# Patient Record
Sex: MALE | Race: WHITE | NOT HISPANIC OR LATINO | Employment: FULL TIME | URBAN - METROPOLITAN AREA
[De-identification: names, ages, dates, MRNs, and addresses within clinical notes are randomized per-mention and may not be internally consistent; named-entity substitution may affect disease eponyms.]

---

## 2017-01-10 DIAGNOSIS — F41.1 GENERALIZED ANXIETY DISORDER: Primary | ICD-10-CM

## 2017-01-10 DIAGNOSIS — G47.00 INSOMNIA, UNSPECIFIED TYPE: ICD-10-CM

## 2017-01-10 RX ORDER — TRAZODONE HYDROCHLORIDE 50 MG/1
50 TABLET, FILM COATED ORAL
Qty: 90 TABLET | Refills: 0 | Status: SHIPPED | OUTPATIENT
Start: 2017-01-10 | End: 2017-02-17

## 2017-01-10 RX ORDER — SERTRALINE HYDROCHLORIDE 25 MG/1
25 TABLET, FILM COATED ORAL DAILY
Qty: 90 TABLET | Refills: 0 | Status: SHIPPED | OUTPATIENT
Start: 2017-01-10 | End: 2017-02-17

## 2017-01-10 NOTE — TELEPHONE ENCOUNTER
Medication is being filled for 1 time refill only due to:  Patient needs to be seen because it has been more than one year since last visit. OK for 90 day refill per Dr Carter to get through until scheduled appt.

## 2017-02-17 ENCOUNTER — OFFICE VISIT (OUTPATIENT)
Dept: FAMILY MEDICINE | Facility: CLINIC | Age: 47
End: 2017-02-17

## 2017-02-17 VITALS
DIASTOLIC BLOOD PRESSURE: 77 MMHG | SYSTOLIC BLOOD PRESSURE: 113 MMHG | HEIGHT: 73 IN | HEART RATE: 54 BPM | OXYGEN SATURATION: 100 % | WEIGHT: 157.08 LBS | BODY MASS INDEX: 20.82 KG/M2

## 2017-02-17 DIAGNOSIS — Z13.1 SCREENING FOR DIABETES MELLITUS: ICD-10-CM

## 2017-02-17 DIAGNOSIS — Z23 NEEDS FLU SHOT: ICD-10-CM

## 2017-02-17 DIAGNOSIS — Z00.00 PREVENTATIVE HEALTH CARE: Primary | ICD-10-CM

## 2017-02-17 DIAGNOSIS — G47.00 INSOMNIA, UNSPECIFIED TYPE: ICD-10-CM

## 2017-02-17 DIAGNOSIS — F41.1 GENERALIZED ANXIETY DISORDER: ICD-10-CM

## 2017-02-17 DIAGNOSIS — Z13.220 SCREENING CHOLESTEROL LEVEL: ICD-10-CM

## 2017-02-17 ASSESSMENT — PAIN SCALES - GENERAL: PAINLEVEL: NO PAIN (0)

## 2017-02-17 NOTE — PROGRESS NOTES
CHIEF COMPLAINT:  Physical exam.      HISTORY OF PRESENT ILLNESS:  Alessio is a 46-year-old gentleman with a history of generalized anxiety disorder.  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  He really enjoys his work.  He is .  His wife is currently dealing with what sounds like possible influenza.  He did not get a shot this year and is willing to do so today.      He is on a combination of paleo and keto (high fat) diet and wanted my opinion about that.  He already went ahead and had extensive laboratory work done (he had this done and the results are put into an heike on his phone).  He reviewed all the results with me.  They look great.  His cholesterol at one point had been 280 (total) and it dropped all the way down to 201.  HDLs are perfect and his LDL is now 131.  Triglycerides are in the 50s.  Glucose has been normal.  A1c was 5.2%.  Liver function tests, electrolytes and complete blood count all normal.  We reviewed all the results in detail.      From a generalized anxiety disorder standpoint, his CLAU-7 score is just 3.  Insomnia responds well to trazodone 50 mg at night.  Anxiety is doing well with sertraline 25 mg daily.  I will go ahead and take care of refills on both of those.      SOCIAL, FAMILY AND PAST MEDICAL HISTORY:  Reviewed and unhanged.        HEALTHCARE MAINTENANCE:  He wears glasses and his eyes are checked every 1-2 years.  He recently had them checked and there has been no change in the prescription.  His hearing is fine.  Dental care is up to date and occurs every 6 months.  /77.  BMI down to 20.9.  He is happy with the weight loss.  When I last saw him 10/2015, it was 173 and now he is at 157.  He is working out at the gym and feels better and stronger than he has in a long time.  Immunizations up to date with the exception of seasonal influenza.  We will give that today.  Labs all done.  Meds will be refilled.  No need for prostate or colon  cancer screening given his young age and lack of family history.      REVIEW OF SYSTEMS:  He would like me to do a skin survey on his back.  I will certainly do that.  Other than that, no concerns.      OBJECTIVE:  Alessio is in absolutely no distress.  Affect upbeat.  Good eye contact.  Excellent insight and judgment.  /77 with a pulse of 54.  He is approximately 6 feet 1 inch and weighs 157 with a BMI of 20.9.  O2 sats are 100% on room air.   HEENT:  Head is normocephalic, atraumatic.  Ears are essentially free of any cerumen.  The TMs look fine.  There is no pain with palpation of the frontal or maxillary sinuses.  Eyes are grossly normal.  Nose is free congestion or discharge.  Teeth in good repair.  Mucous membranes are moist.  Throat looks benign.   NECK:  Supple without adenopathy or thyromegaly.  No carotid bruits are heard, no JVD.   BACK:  Smooth and straight.  No pain to percussion, no CVA tenderness.   LUNGS:  Clear to auscultation bilaterally.   HEART:  Reveals a regular rate and rhythm without murmur.   ABDOMEN:  Benign.   EXTREMITIES:  Ankles are free of any edema.  Good peripheral pulses.     SKIN:  Careful examination of the skin reveals a number of various skin changes on his back.  Freckles on the top of the shoulders.  He has some compound nevi, cherry angiomas, flat nevi but no precancerous or suspicious lesions.  I surveyed his face, chest and arms, as well as his back.  Nothing of any concern noted.        Labs have already been done.      ASSESSMENT AND PLAN:   1.  Adult physical exam, up to date with healthcare maintenance strategies.   2.  Seasonal influenza immunization given today.   3.  Extensive laboratory work done elsewhere.  All results were reviewed and all normal.   4.  History of generalized anxiety disorder, on sertraline 25 mg daily.  Continue with that.  Quantity 90 with 4 refills sent in.   5.  Chronic insomnia, responding well to trazodone 50 mg at night.  Continue with  that.  Quantity 90 with 4 refills sent in as well.   6.  I look forward to seeing him back in approximately 15 months.  Call with any problems or questions in the meantime.

## 2017-02-17 NOTE — MR AVS SNAPSHOT
After Visit Summary   2/17/2017    Alessio Dallas    MRN: [id number]           Patient Information     Date Of Birth          1970        Visit Information        Provider Department      2/17/2017 8:00 AM Papo Carter MD [facility]        Today's Diagnoses     Preventative health care    -  1    Screening for diabetes mellitus        Screening cholesterol level        Generalized anxiety disorder        Insomnia, unspecified type        Needs flu shot          Care Instructions      Preventive Health Recommendations  Male Ages 40 to 49    Yearly exam:             See your health care provider every year in order to  o   Review health changes.   o   Discuss preventive care.    o   Review your medicines if your doctor has prescribed any.    You should be tested each year for STDs (sexually transmitted diseases) if you re at risk.     Have a cholesterol test every 5 years.     Have a colonoscopy (test for colon cancer) if someone in your family has had colon cancer or polyps before age 50.     After age 45, have a diabetes test (fasting glucose). If you are at risk for diabetes, you should have this test every 3 years.      Talk with your health care provider about whether or not a prostate cancer screening test (PSA) is right for you.    Shots: Get a flu shot each year. Get a tetanus shot every 10 years.     Nutrition:    Eat at least 5 servings of fruits and vegetables daily.     Eat whole-grain bread, whole-wheat pasta and brown rice instead of white grains and rice.     Talk to your provider about Calcium and Vitamin D.     Lifestyle    Exercise for at least 150 minutes a week (30 minutes a day, 5 days a week). This will help you control your weight and prevent disease.     Limit alcohol to one drink per day.     No smoking.     Wear sunscreen to prevent skin cancer.     See your dentist every six months for an exam and cleaning.            Follow-ups after your visit       "  Follow-up notes from your care team     Return if symptoms worsen or fail to improve.      Who to contact     Please call your clinic at [phone] to:    Ask questions about your health    Make or cancel appointments    Discuss your medicines    Learn about your test results    Speak to your doctor   If you have compliments or concerns about an experience at your clinic, or if you wish to file a complaint, please contact [facility] Patient Relations at [phone] or email us at [email or web address]         Additional Information About Your Visit        [employer] Information     StackSearch gives you secure access to your electronic health record. If you see a primary care provider, you can also send messages to your care team and make appointments. If you have questions, please call your primary care clinic.  If you do not have a primary care provider, please call [phone] and they will assist you.      StackSearch is an electronic gateway that provides easy, online access to your medical records. With StackSearch, you can request a clinic appointment, read your test results, renew a prescription or communicate with your care team.     To access your existing account, please contact your [facility] or call [phone] for assistance.        Care EveryWhere ID     This is your Care EveryWhere ID. This could be used by other organizations to access your [place] medical records  [id number]        Your Vitals Were     Pulse Height Pulse Oximetry BMI (Body Mass Index)          54 6' 0.7\" (184.7 cm) 100% 20.9 kg/m2         Blood Pressure from Last 3 Encounters:   02/17/17 113/77   10/20/15 107/72   07/11/15 102/68    Weight from Last 3 Encounters:   02/17/17 157 lb 1.3 oz (71.3 kg)   10/20/15 173 lb (78.5 kg)   07/11/15 165 lb (74.8 kg)              We Performed the Following     ADMIN INFLUENZA VIRUS VACCINE     C FLU VAC QUADRIVALENT SPLIT VIRUS 3+YRS "         Primary Care Provider Office Phone # Fax #    Papo Carter [phone]       [facility] [address] [place] [address]        Thank you!     Thank you for choosing [facility]  for your care. Our goal is always to provide you with excellent care. Hearing back from our patients is one way we can continue to improve our services. Please take a few minutes to complete the written survey that you may receive in the mail after your visit with us. Thank you!             Your Updated Medication List - Protect others around you: Learn how to safely use, store and throw away your medicines at www.disposemymeds.org.          This list is accurate as of: 2/17/17  9:10 AM.  Always use your most recent med list.                   Brand Name Dispense Instructions for use    sertraline 25 MG tablet    ZOLOFT    90 tablet    Take 1 tablet (25 mg) by mouth daily       traZODone 50 MG tablet    DESYREL    90 tablet    Take 1 tablet (50 mg) by mouth nightly as needed for sleep

## 2017-02-17 NOTE — NURSING NOTE
"46 year old  Chief Complaint   Patient presents with     Physical     Refill Request     on Zoloft and Trazodone.       Blood pressure 113/77, pulse 54, height 6' 0.7\" (184.7 cm), weight 157 lb 1.3 oz (71.3 kg), SpO2 100 %. Body mass index is 20.9 kg/(m^2).  Patient Active Problem List   Diagnosis     Preventative health care     Generalized anxiety disorder     Insomnia     Myopia     Irritable bowel syndrome       Wt Readings from Last 2 Encounters:   02/17/17 157 lb 1.3 oz (71.3 kg)   10/20/15 173 lb (78.5 kg)     BP Readings from Last 3 Encounters:   02/17/17 113/77   10/20/15 107/72   07/11/15 102/68         Current Outpatient Prescriptions   Medication     sertraline (ZOLOFT) 25 MG tablet     traZODone (DESYREL) 50 MG tablet     No current facility-administered medications for this visit.        Social History   Substance Use Topics     Smoking status: Never Smoker     Smokeless tobacco: Never Used     Alcohol use Yes      Comment: Sometime       Health Maintenance Due   Topic Date Due     PHQ-9 Q3 MONTHS (NO INBASKET)  06/12/2014     CLAU QUESTIONNAIRE 3 MONTHS  01/20/2016     INFLUENZA VACCINE (SYSTEM ASSIGNED)  09/01/2016       No results found for: ARLEEN Varma CMA  February 17, 2017 8:00 AM  "

## 2017-02-17 NOTE — NURSING NOTE
"46 year old  Chief Complaint   Patient presents with     Physical     Refill Request     on Zoloft and Trazodone.       Blood pressure 113/77, pulse 54, height 6' 0.7\" (184.7 cm), weight 157 lb 1.3 oz (71.3 kg), SpO2 100 %. Body mass index is 20.9 kg/(m^2).  Patient Active Problem List   Diagnosis     Preventative health care     Generalized anxiety disorder     Insomnia     Myopia     Irritable bowel syndrome       Wt Readings from Last 2 Encounters:   02/17/17 157 lb 1.3 oz (71.3 kg)   10/20/15 173 lb (78.5 kg)     BP Readings from Last 3 Encounters:   02/17/17 113/77   10/20/15 107/72   07/11/15 102/68         Current Outpatient Prescriptions   Medication     sertraline (ZOLOFT) 25 MG tablet     traZODone (DESYREL) 50 MG tablet     No current facility-administered medications for this visit.        Social History   Substance Use Topics     Smoking status: Never Smoker     Smokeless tobacco: Never Used     Alcohol use Yes      Comment: Sometime       Health Maintenance Due   Topic Date Due     PHQ-9 Q3 MONTHS (NO INBASKET)  06/12/2014     CLAU QUESTIONNAIRE 3 MONTHS  01/20/2016     INFLUENZA VACCINE (SYSTEM ASSIGNED)  09/01/2016       No results found for: ARLEEN Varma CMA  February 17, 2017 8:45 AM    Injectable Influenza Immunization Documentation      1.  Has the patient received the information for the injectable influenza vaccine? YES    2. Is the patient 6 months of age or older? YES    3. Does the patient have any of the following contraindications?          Severe allergy to eggs? No     Severe allergic reaction to previous influenza vaccines? No     Allergy to contact lens solution/thimerosol? No     History of Guillain-[place] syndrome? No     Undergoing chemotherapy or radiation therapy?       (vaccine should be given at least 2 weeks prior or 3 weeks after)  No     Currently have moderate or severe illness? No         4.  The vaccine has been administered and the patient was instructed to wait 15 " minutes before leaving the building in the event of an allergic reaction: YES    Vaccination given by Betty [facility]En[facility]  February 17, 2017 8:45 AM

## 2017-02-19 RX ORDER — SERTRALINE HYDROCHLORIDE 25 MG/1
25 TABLET, FILM COATED ORAL DAILY
Qty: 90 TABLET | Refills: 4 | Status: SHIPPED | OUTPATIENT
Start: 2017-02-19 | End: 2018-03-08

## 2017-02-19 RX ORDER — TRAZODONE HYDROCHLORIDE 50 MG/1
50 TABLET, FILM COATED ORAL
Qty: 90 TABLET | Refills: 4 | Status: SHIPPED | OUTPATIENT
Start: 2017-02-19 | End: 2018-03-08

## 2017-02-20 ASSESSMENT — ANXIETY QUESTIONNAIRES
GAD7 TOTAL SCORE: 3
IF YOU CHECKED OFF ANY PROBLEMS ON THIS QUESTIONNAIRE, HOW DIFFICULT HAVE THESE PROBLEMS MADE IT FOR YOU TO DO YOUR WORK, TAKE CARE OF THINGS AT HOME, OR GET ALONG WITH OTHER PEOPLE: NOT DIFFICULT AT ALL
2. NOT BEING ABLE TO STOP OR CONTROL WORRYING: NOT AT ALL
5. BEING SO RESTLESS THAT IT IS HARD TO SIT STILL: NOT AT ALL
1. FEELING NERVOUS, ANXIOUS, OR ON EDGE: SEVERAL DAYS
6. BECOMING EASILY ANNOYED OR IRRITABLE: NOT AT ALL
3. WORRYING TOO MUCH ABOUT DIFFERENT THINGS: SEVERAL DAYS
7. FEELING AFRAID AS IF SOMETHING AWFUL MIGHT HAPPEN: NOT AT ALL

## 2017-02-20 ASSESSMENT — PATIENT HEALTH QUESTIONNAIRE - PHQ9: 5. POOR APPETITE OR OVEREATING: SEVERAL DAYS

## 2017-02-21 ASSESSMENT — ANXIETY QUESTIONNAIRES: GAD7 TOTAL SCORE: 3

## 2017-02-21 ASSESSMENT — PATIENT HEALTH QUESTIONNAIRE - PHQ9: SUM OF ALL RESPONSES TO PHQ QUESTIONS 1-9: 4

## 2017-10-25 ENCOUNTER — OFFICE VISIT (OUTPATIENT)
Dept: OPHTHALMOLOGY | Facility: CLINIC | Age: 47
End: 2017-10-25

## 2017-10-25 DIAGNOSIS — H10.10 ALLERGIC CONJUNCTIVITIS, UNSPECIFIED LATERALITY: Primary | ICD-10-CM

## 2017-10-25 DIAGNOSIS — H52.203 MYOPIA WITH ASTIGMATISM, BILATERAL: ICD-10-CM

## 2017-10-25 DIAGNOSIS — H52.13 MYOPIA WITH ASTIGMATISM, BILATERAL: ICD-10-CM

## 2017-10-25 ASSESSMENT — CUP TO DISC RATIO
OD_RATIO: 0.3
OS_RATIO: 0.3

## 2017-10-25 ASSESSMENT — CONF VISUAL FIELD
OS_NORMAL: 1
OD_NORMAL: 1

## 2017-10-25 ASSESSMENT — REFRACTION_WEARINGRX
OS_AXIS: 065
OS_CYLINDER: +0.75
OS_SPHERE: -4.25
OD_SPHERE: -4.25
OD_AXIS: 070
OD_CYLINDER: +0.25

## 2017-10-25 ASSESSMENT — TONOMETRY
OD_IOP_MMHG: 18
IOP_METHOD: ICARE
OS_IOP_MMHG: 17

## 2017-10-25 ASSESSMENT — VISUAL ACUITY
OS_CC: 20/20
CORRECTION_TYPE: GLASSES
OD_CC: 20/20
METHOD: SNELLEN - LINEAR

## 2017-10-25 ASSESSMENT — EXTERNAL EXAM - RIGHT EYE: OD_EXAM: NORMAL

## 2017-10-25 ASSESSMENT — REFRACTION_MANIFEST
OD_AXIS: 070
OS_AXIS: 065
OS_CYLINDER: +0.75
OS_SPHERE: -4.25
OD_SPHERE: -4.50
OD_CYLINDER: +0.50

## 2017-10-25 ASSESSMENT — SLIT LAMP EXAM - LIDS
COMMENTS: NORMAL
COMMENTS: NORMAL

## 2017-10-25 ASSESSMENT — EXTERNAL EXAM - LEFT EYE: OS_EXAM: NORMAL

## 2017-10-25 NOTE — PROGRESS NOTES
Assessment & Plan      Alessio Dallas is a 47 year old male with the following diagnoses:   (H52.13,  H52.203) Myopia with astigmatism, bilateral  (primary encounter diagnosis)  Comment: First bifocal  Plan: Rx    (H10.10) Allergic conjunctivitis, unspecified laterality  Comment: Moderate  Plan: Continue Zaditor     -----------------------------------------------------------------------------------      Patient disposition:   Return in about 18 months (around 4/25/2019) for Complete Eye Exam. or sooner as needed.    Complete documentation of historical and exam elements from today's encounter can  be found in the full encounter summary report (not reduplicated in this progress  note). I personally obtained the chief complaint(s) and history of present illness. I  confirmed and edited as necessary the review of systems, past medical/surgical  history, family history, social history, and examination findings as documented by  others; and I examined the patient myself. I personally reviewed the relevant tests,  images, and reports as documented above. I formulated and edited as necessary the  assessment and plan and discussed the findings and management plan with the  patient and family.    BIMAL Eid M.D

## 2017-10-25 NOTE — MR AVS SNAPSHOT
After Visit Summary   10/25/2017    Alessio Dallas    MRN: [id number]           Patient Information     Date Of Birth          1970        Visit Information        Provider Department      10/25/2017 8:20 AM Angel Eid MD [facility]        Today's Diagnoses     Myopia with astigmatism, bilateral    -  1    Allergic conjunctivitis, unspecified laterality           Follow-ups after your visit        Follow-up notes from your care team     Return in about 18 months (around 4/25/2019) for Complete Eye Exam.      Who to contact     Please call your clinic at [phone] to:    Ask questions about your health    Make or cancel appointments    Discuss your medicines    Learn about your test results    Speak to your doctor   If you have compliments or concerns about an experience at your clinic, or if you wish to file a complaint, please contact [facility] Patient Relations at [phone] or email us at [email or web address]         Additional Information About Your Visit        MyChart Information     Mobilewallat gives you secure access to your electronic health record. If you see a primary care provider, you can also send messages to your care team and make appointments. If you have questions, please call your primary care clinic.  If you do not have a primary care provider, please call [phone] and they will assist you.      PlayRaven is an electronic gateway that provides easy, online access to your medical records. With [employer]Raven, you can request a clinic appointment, read your test results, renew a prescription or communicate with your care team.     To access your existing account, please contact your [facility] or call [phone] for assistance.        Care EveryWhere ID     This is your Care EveryWhere ID. This could be used by other organizations to access your [facility]  records  [id number]         Blood Pressure from Last 3 Encounters:   02/17/17 113/77   10/20/15 107/72   07/11/15 102/68    Weight from Last 3 Encounters:   02/17/17 71.3 kg (157 lb 1.3 oz)   10/20/15 78.5 kg (173 lb)   07/11/15 74.8 kg (165 lb)              Today, you had the following     No orders found for display       Primary Care Provider Office Phone # Fax #    Papo Carter [phone]       [address] [place] [address]        Equal Access to Services     [facility]: Hadii lisbeth parker hadasho Somacario, waaxda luqadaha, qaybta kaalmada adepurayada, viry alarcon . So [facility] [phone].    ATENCIÓN: Si habla español, tiene a jauregui disposición servicios gratuitos de asistencia lingüística. [facility] [phone].    We comply with applicable federal civil rights laws and [place] laws. We do not discriminate on the basis of race, color, national origin, age, disability, sex, sexual orientation, or gender identity.            Thank you!     Thank you for choosing [facility]  for your care. Our goal is always to provide you with excellent care. Hearing back from our patients is one way we can continue to improve our services. Please take a few minutes to complete the written survey that you may receive in the mail after your visit with us. Thank you!             Your Updated Medication List - Protect others around you: Learn how to safely use, store and throw away your medicines at www.disposemymeds.org.          This list is accurate as of: 10/25/17  9:16 AM.  Always use your most recent med list.                   Brand Name Dispense Instructions for use Diagnosis    ketotifen 0.025 % Soln ophthalmic solution    ZADITOR/REFRESH ANTI-ITCH     1 drop 2 times daily        sertraline 25 MG tablet    ZOLOFT    90 tablet    Take 1 tablet (25 mg) by mouth daily    Generalized anxiety disorder       traZODone 50 MG tablet    DESYREL     90 tablet    Take 1 tablet (50 mg) by mouth nightly as needed for sleep    Insomnia, unspecified type

## 2017-10-25 NOTE — NURSING NOTE
Chief Complaints and History of Present Illnesses   Patient presents with     Blurred Vision Both Eyes     HPI    Last Eye Exam:  10/13/14   Affected eye(s):  Both   Symptoms:     Difficulty with reading   No floaters   No flashes   Itching      Unknown duration    Frequency:  Daily          Comments:  Pt complains of blurry vision at near. Notes that he finds himself taking off glasses to focus at near. BE itch occasionally, more with seasonal allergies. EDDIE GALVAN, COA 8:45 AM 10/25/2017

## 2017-11-17 ENCOUNTER — TELEPHONE (OUTPATIENT)
Dept: OPHTHALMOLOGY | Facility: CLINIC | Age: 47
End: 2017-11-17

## 2017-11-17 NOTE — TELEPHONE ENCOUNTER
----- Message from Consuelo Cavazos sent at 11/17/2017  2:04 PM CST -----  Regarding: Dr. Eid told pt that his appt could be coded/billed in different ways...  Contact: [phone]  Pt rec'd a very large bill, over $688.00 and he is requesting help to code the appt differently?      Please call pt at [phone]    Thank you,  Agustín OSMAN    Please DO NOT send this message and/or reply back to sender.  Call Center Representatives DO NOT respond to messages.

## 2017-12-28 ENCOUNTER — HOSPITAL ENCOUNTER (OUTPATIENT)
Dept: CT IMAGING | Facility: CLINIC | Age: 47
Discharge: HOME | End: 2017-12-28
Attending: FAMILY MEDICINE | Admitting: FAMILY MEDICINE
Payer: COMMERCIAL

## 2017-12-28 DIAGNOSIS — E78.00 HIGH CHOLESTEROL: ICD-10-CM

## 2017-12-28 PROCEDURE — 75571 CT HRT W/O DYE W/CA TEST: CPT

## 2017-12-28 PROCEDURE — 75571 CT HRT W/O DYE W/CA TEST: CPT | Mod: 26 | Performed by: INTERNAL MEDICINE

## 2017-12-29 NOTE — PROGRESS NOTES
Calcium scan was sent to PCP through [employer] and sent to the patient via regular mail. Gave results to the fellow.

## 2018-03-08 DIAGNOSIS — F41.1 GENERALIZED ANXIETY DISORDER: ICD-10-CM

## 2018-03-08 DIAGNOSIS — G47.00 INSOMNIA, UNSPECIFIED TYPE: ICD-10-CM

## 2018-03-08 RX ORDER — SERTRALINE HYDROCHLORIDE 25 MG/1
25 TABLET, FILM COATED ORAL DAILY
Qty: 90 TABLET | Refills: 0 | Status: SHIPPED | OUTPATIENT
Start: 2018-03-08 | End: 2018-03-29

## 2018-03-08 RX ORDER — TRAZODONE HYDROCHLORIDE 50 MG/1
50 TABLET, FILM COATED ORAL
Qty: 90 TABLET | Refills: 0 | Status: SHIPPED | OUTPATIENT
Start: 2018-03-08 | End: 2018-03-09 | Stop reason: ALTCHOICE

## 2018-03-08 NOTE — TELEPHONE ENCOUNTER
Last office visit: 02/17/2017, future appointment 03/29/2018.    Medication is being filled for 1 time refill only due to:  Patient needs to be seen because it has been more than one year since last visit.

## 2018-03-09 DIAGNOSIS — G47.00 INSOMNIA, UNSPECIFIED TYPE: Primary | ICD-10-CM

## 2018-03-09 RX ORDER — TRAZODONE HYDROCHLORIDE 100 MG/1
50 TABLET ORAL
Qty: 45 TABLET | Refills: 0 | Status: SHIPPED | OUTPATIENT
Start: 2018-03-09 | End: 2018-03-29

## 2018-03-09 NOTE — TELEPHONE ENCOUNTER
Prescription approved per [facility] Refill Protocol.  Of note, 50 mg tabs on back order , so have to send in for 100 mg tabs, take half a tab.     Adrienne Davis RN  March 9, 2018 11:29 AM

## 2018-03-29 ENCOUNTER — OFFICE VISIT (OUTPATIENT)
Dept: FAMILY MEDICINE | Facility: CLINIC | Age: 48
End: 2018-03-29
Payer: COMMERCIAL

## 2018-03-29 VITALS
WEIGHT: 158 LBS | HEART RATE: 74 BPM | TEMPERATURE: 97.4 F | DIASTOLIC BLOOD PRESSURE: 74 MMHG | HEIGHT: 73 IN | OXYGEN SATURATION: 100 % | BODY MASS INDEX: 20.94 KG/M2 | SYSTOLIC BLOOD PRESSURE: 112 MMHG

## 2018-03-29 DIAGNOSIS — G47.00 INSOMNIA, UNSPECIFIED TYPE: ICD-10-CM

## 2018-03-29 DIAGNOSIS — Z13.1 SCREENING FOR DIABETES MELLITUS: ICD-10-CM

## 2018-03-29 DIAGNOSIS — F41.1 GENERALIZED ANXIETY DISORDER: ICD-10-CM

## 2018-03-29 DIAGNOSIS — Z13.220 SCREENING CHOLESTEROL LEVEL: ICD-10-CM

## 2018-03-29 DIAGNOSIS — Z00.00 PREVENTATIVE HEALTH CARE: Primary | ICD-10-CM

## 2018-03-29 RX ORDER — SERTRALINE HYDROCHLORIDE 25 MG/1
25 TABLET, FILM COATED ORAL DAILY
Qty: 90 TABLET | Refills: 4 | Status: SHIPPED | OUTPATIENT
Start: 2018-03-29 | End: 2018-08-10 | Stop reason: DRUGHIGH

## 2018-03-29 RX ORDER — TRAZODONE HYDROCHLORIDE 100 MG/1
50 TABLET ORAL
Qty: 45 TABLET | Refills: 4 | Status: SHIPPED | OUTPATIENT
Start: 2018-03-29 | End: 2019-05-02

## 2018-03-29 ASSESSMENT — PAIN SCALES - GENERAL: PAINLEVEL: NO PAIN (0)

## 2018-03-29 NOTE — MR AVS SNAPSHOT
"              After Visit Summary   3/29/2018    Alessio Dallas    MRN: [id number]           Patient Information     Date Of Birth          1970        Visit Information        Provider Department      3/29/2018 8:40 AM Papo Carter MD [facility]        Today's Diagnoses     Screening for diabetes mellitus    -  1    Screening cholesterol level        Insomnia, unspecified type        Generalized anxiety disorder           Follow-ups after your visit        Who to contact     Please call your clinic at [phone] to:    Ask questions about your health    Make or cancel appointments    Discuss your medicines    Learn about your test results    Speak to your doctor            Additional Information About Your Visit        MyChart Information     [employer] gives you secure access to your electronic health record. If you see a primary care provider, you can also send messages to your care team and make appointments. If you have questions, please call your primary care clinic.  If you do not have a primary care provider, please call [phone] and they will assist you.      [employer] is an electronic gateway that provides easy, online access to your medical records. With [employer], you can request a clinic appointment, read your test results, renew a prescription or communicate with your care team.     To access your existing account, please contact your [facility] or call [phone] for assistance.        Care EveryWhere ID     This is your Care EveryWhere ID. This could be used by other organizations to access your [place] medical records  [id number]        Your Vitals Were     Pulse Temperature Height Pulse Oximetry BMI (Body Mass Index)       74 97.4  F (36.3  C) (Oral) 6' 1\" (185.4 cm) 100% 20.85 kg/m2        Blood Pressure from Last 3 Encounters:   03/29/18 112/74   02/17/17 113/77   10/20/15 107/72    Weight from Last 3 Encounters:   03/29/18 158 lb (71.7 kg) "   02/17/17 157 lb 1.3 oz (71.3 kg)   10/20/15 173 lb (78.5 kg)              Today, you had the following     No orders found for display         Where to get your medicines      These medications were sent to [id number] - [place], [place] - [address] AT [facility] [address]  [address], [facility] [address]     Phone:  [phone]     sertraline 25 MG tablet    traZODone 100 MG tablet          Primary Care Provider Office Phone # Fax #    Papo Carter [phone]       [address] [place] [address]        Equal Access to Services     SETH LENZ : Hadii lisbeth hernandezo Jose Juan, waaxda luqadaha, qaybta kaalmada adepurayalindsey, viry cisse. So [place] [phone].    ATENCIÓN: Si habla español, tiene a jauregui disposición servicios gratuitos de asistencia lingüística. [facility] [phone].    We comply with applicable federal civil rights laws and [place] laws. We do not discriminate on the basis of race, color, national origin, age, disability, sex, sexual orientation, or gender identity.            Thank you!     Thank you for choosing [facility]  for your care. Our goal is always to provide you with excellent care. Hearing back from our patients is one way we can continue to improve our services. Please take a few minutes to complete the written survey that you may receive in the mail after your visit with us. Thank you!             Your Updated Medication List - Protect others around you: Learn how to safely use, store and throw away your medicines at www.disposemymeds.org.          This list is accurate as of 3/29/18 11:36 AM.  Always use your most recent med list.                   Brand Name Dispense Instructions for use Diagnosis    ketotifen 0.025 % Soln ophthalmic solution    ZADITOR/REFRESH ANTI-ITCH     1 drop 2 times daily        sertraline 25 MG tablet    ZOLOFT    90 tablet    Take 1 tablet (25 mg) by mouth  daily    Generalized anxiety disorder       traZODone 100 MG tablet    DESYREL    45 tablet    Take 0.5 tablets (50 mg) by mouth nightly as needed for sleep Note change in pill strength (backorder)    Insomnia, unspecified type

## 2018-03-29 NOTE — PROGRESS NOTES
"CHIEF COMPLAINT:  Physical exam.      HISTORY OF PRESENT ILLNESS:  Alessio is a 47-year-old here for the above.  Overall, he is doing fairly well.  The biggest concern that he has is that he has had about 3 months now of some degree of throat pain.  It is a relatively low amount of pain, but it is persistent and at times it makes it difficult to talk.  He has no trouble with actual swallowing.  No trouble handling fluids.  He does not have any sinus pain or pressure.  He knows that he has a mold allergy and that is flaring a little bit.  He is not coughing.  He has had no unexplained weight loss.  His appetite is quite good.      He has been doing some reading and read about \"silent reflux.\"  It is possible that he might have that.  He certainly knows that the degree of stress he is having at work has increased significantly.  He will be going to [place] next week for a 5-day vacation.  He is really looking forward to that.      ACTIVE MEDICAL PROBLEMS:  Reviewed.      CURRENT MEDICATIONS:  Reviewed.  He needs a refill on both his trazodone and sertraline and I took care of both of those things.      FAMILY, SOCIAL, PAST SURGICAL HISTORY:  All unchanged.      HEALTHCARE MAINTENANCE:  He wears glasses and his eyes are checked typically every other year.  His hearing is just fine.  Dental care is actually quite good.  He is on a ketogenic diet and he feels that his gums have actually healed up quite a bit.  Blood pressure is perfect at 112/74.  Body mass index is also perfect at 20.85.  Weight is up 1 pound which is reassuring given his throat pain.  Labs had been done previously.  Cholesterol 214 with an LDL of 135 and an HDL of 64.  Ratio of 3.3.  Triglycerides 54.  Glucose was normal.  He is 47 and does not need to have a PSA or colon cancer screening done at this point.      REVIEW OF SYSTEMS:  A 10-point review of systems is negative other than this throat pain above.      OBJECTIVE:  Alessio is in no distress.  BP " is 112/74 with a heart rate of 74 and regular.  Temperature is 97.4.  He is 6 feet 1 and weighs 158 pounds with a BMI of 20.85.  O2 sats are 100% on room air.  PHQ-9 came back with a total score of 6.  CLAU-7 score was 11.   HEENT:  Head is normocephalic, atraumatic.  There is no pain with palpation of the frontal or maxillary sinuses.  Eyes are grossly normal.  Nose is free of any congestion or discharge.  Teeth in good repair.  Mucous membranes are moist.  Throat looks benign.   NECK:  Supple without adenopathy or thyromegaly.  No carotid bruits are heard, no JVD.   BACK:  Smooth and straight.  No pain to percussion.  No CVA tenderness.   LUNGS:  Clear to auscultation bilaterally.   HEART:  Regular rate and rhythm without murmur.   ABDOMEN:  Benign.     EXTREMITIES:  Ankles are free of any edema.  Good peripheral pulses.      LABORATORY DATA:  No labs needed today.      ASSESSMENT AND PLAN:   1.  Adult physical exam, up-to-date with health care maintenance strategies.   2.  History of generalized anxiety disorder, longstanding.  Sertraline 25 mg, quantity 90, 4 refills, sent in.  He has some persistent insomnia.  Trazodone 100 mg tablets, one-half tablet (50 mg equivalent) at night.  Refill sent in.   3.  No evidence of diabetes or hyperlipidemia.   4.  Throat pain that is slightly hard to characterize.  Indeed, it could be due to silent reflux.  I am going to recommend that he take an over-the-counter course of Prilosec (omeprazole) 20 mg once daily for 14 days.  This will take him up to and through his vacation.  When he returns, if he is still having some throat pain, I will refer him to the [facility] through the ENT Clinic at the [facility].  There, he can meet with either Dr. Supa Todd or Dr. Savanna Mcintosh and it might also be appropriate for him to meet with our speech and language therapist.  He is in complete agreement with this.  He will call with any problems or questions.

## 2018-03-29 NOTE — NURSING NOTE
"47 year old  Chief Complaint   Patient presents with     Physical     Throat Pain     ever since Reynaldo time. Pain is low, but patien't states,\"it makes it hard to talk.\"     Refill Request     on Trazodone.       Blood pressure 112/74, pulse 74, temperature 97.4  F (36.3  C), temperature source Oral, height 6' 1\" (185.4 cm), weight 158 lb (71.7 kg), SpO2 100 %. Body mass index is 20.85 kg/(m^2).  Patient Active Problem List   Diagnosis     Preventative health care     Generalized anxiety disorder     Insomnia     Myopia     Irritable bowel syndrome       Wt Readings from Last 2 Encounters:   03/29/18 158 lb (71.7 kg)   02/17/17 157 lb 1.3 oz (71.3 kg)     BP Readings from Last 3 Encounters:   03/29/18 112/74   02/17/17 113/77   10/20/15 107/72         Current Outpatient Prescriptions   Medication     traZODone (DESYREL) 100 MG tablet     sertraline (ZOLOFT) 25 MG tablet     ketotifen (ZADITOR/REFRESH ANTI-ITCH) 0.025 % SOLN ophthalmic solution     No current facility-administered medications for this visit.        Social History   Substance Use Topics     Smoking status: Never Smoker     Smokeless tobacco: Never Used     Alcohol use Yes      Comment: Sometime       Health Maintenance Due   Topic Date Due     CLAU QUESTIONNAIRE 3 MONTHS  05/17/2017     PHQ-9 Q3 MONTHS  05/17/2017       No results found for: ARLEEN Varma CMA  March 29, 2018 8:42 AM  "

## 2018-04-03 ASSESSMENT — ANXIETY QUESTIONNAIRES
7. FEELING AFRAID AS IF SOMETHING AWFUL MIGHT HAPPEN: NOT AT ALL
2. NOT BEING ABLE TO STOP OR CONTROL WORRYING: MORE THAN HALF THE DAYS
IF YOU CHECKED OFF ANY PROBLEMS ON THIS QUESTIONNAIRE, HOW DIFFICULT HAVE THESE PROBLEMS MADE IT FOR YOU TO DO YOUR WORK, TAKE CARE OF THINGS AT HOME, OR GET ALONG WITH OTHER PEOPLE: SOMEWHAT DIFFICULT
1. FEELING NERVOUS, ANXIOUS, OR ON EDGE: MORE THAN HALF THE DAYS
5. BEING SO RESTLESS THAT IT IS HARD TO SIT STILL: NOT AT ALL
6. BECOMING EASILY ANNOYED OR IRRITABLE: MORE THAN HALF THE DAYS
GAD7 TOTAL SCORE: 11
3. WORRYING TOO MUCH ABOUT DIFFERENT THINGS: MORE THAN HALF THE DAYS

## 2018-04-03 ASSESSMENT — PATIENT HEALTH QUESTIONNAIRE - PHQ9: 5. POOR APPETITE OR OVEREATING: NEARLY EVERY DAY

## 2018-04-04 ASSESSMENT — ANXIETY QUESTIONNAIRES: GAD7 TOTAL SCORE: 11

## 2018-04-04 ASSESSMENT — PATIENT HEALTH QUESTIONNAIRE - PHQ9: SUM OF ALL RESPONSES TO PHQ QUESTIONS 1-9: 6

## 2018-04-12 DIAGNOSIS — R49.0 HOARSENESS: Primary | ICD-10-CM

## 2018-07-20 ENCOUNTER — TELEPHONE (OUTPATIENT)
Dept: OTOLARYNGOLOGY | Facility: CLINIC | Age: 48
End: 2018-07-20

## 2018-07-20 NOTE — TELEPHONE ENCOUNTER
FUTURE VISIT INFORMATION      FUTURE VISIT INFORMATION:    Date: 07/30/2018    Time: 9:40    Location: [facility]  REFERRAL INFORMATION:    Referring provider:  Papo Carter MD    Referring providers clinic:  [facility]    Reason for visit/diagnosis  Hoarseness        RECORDS STATUS    I SPOKE WITH PATIENT HE HAS NOT SEEN ANYONE FOR THIS ISSUE. THIS WILL BE HIS FIRST TIME.

## 2018-07-30 ENCOUNTER — PRE VISIT (OUTPATIENT)
Dept: OTOLARYNGOLOGY | Facility: CLINIC | Age: 48
End: 2018-07-30

## 2018-07-30 ENCOUNTER — OFFICE VISIT (OUTPATIENT)
Dept: OTOLARYNGOLOGY | Facility: CLINIC | Age: 48
End: 2018-07-30
Payer: COMMERCIAL

## 2018-07-30 VITALS — BODY MASS INDEX: 20.94 KG/M2 | HEIGHT: 73 IN | WEIGHT: 158 LBS

## 2018-07-30 DIAGNOSIS — R07.0 THROAT DISCOMFORT: ICD-10-CM

## 2018-07-30 DIAGNOSIS — R49.0 MUSCLE TENSION DYSPHONIA: Primary | ICD-10-CM

## 2018-07-30 DIAGNOSIS — F41.9 ANXIETY: ICD-10-CM

## 2018-07-30 NOTE — MR AVS SNAPSHOT
"              After Visit Summary   7/30/2018    Alessio Dallas    MRN: [id number]           Patient Information     Date Of Birth          1970        Visit Information        Provider Department      7/30/2018 9:40 AM Savanna Mcintosh MD [facility]        Today's Diagnoses     Hoarseness    -  1       Follow-ups after your visit        Your next 10 appointments already scheduled     Aug 15, 2018  1:00 PM CDT   (Arrive by 12:45 PM)   NEW SLEEP VOICE with [facility] ([facility])    [address] [place] [address] [phone]              Who to contact     Please call your clinic at [phone] to:    Ask questions about your health    Make or cancel appointments    Discuss your medicines    Learn about your test results    Speak to your doctor            Additional Information About Your Visit        MyChart Information     [employer] gives you secure access to your electronic health record. If you see a primary care provider, you can also send messages to your care team and make appointments. If you have questions, please call your primary care clinic.  If you do not have a primary care provider, please call [phone] and they will assist you.      [employer] is an electronic gateway that provides easy, online access to your medical records. With [employer], you can request a clinic appointment, read your test results, renew a prescription or communicate with your care team.     To access your existing account, please contact your [facility] or call [phone] for assistance.        Care EveryWhere ID     This is your Care EveryWhere ID. This could be used by other organizations to access your [place] medical records  [id number]        Your Vitals Were     Height BMI (Body Mass Index)                1.854 m (6' 1\") 20.85 kg/m2           Blood Pressure from Last 3 " Encounters:   03/29/18 112/74   02/17/17 113/77   10/20/15 107/72    Weight from Last 3 Encounters:   07/30/18 71.7 kg (158 lb)   03/29/18 71.7 kg (158 lb)   02/17/17 71.3 kg (157 lb 1.3 oz)              We Performed the Following     IMAGESTREAM RECORDING ORDER        Primary Care Provider Office Phone # Fax #    Papo Carter [phone]       [address] [place] [address]        Equal Access to Services     [facility]: Hadii aad ku hadasho Soomaali, waaxda luqadaha, qaybta kaalmada adepurayalindsey, viry alarcon . So [facility] [phone].    ATENCIÓN: Si habla español, tiene a jauregui disposición servicios gratuitos de asistencia lingüística. [facility] [phone].    We comply with applicable federal civil rights laws and [place] laws. We do not discriminate on the basis of race, color, national origin, age, disability, sex, sexual orientation, or gender identity.            Thank you!     Thank you for choosing [facility]  for your care. Our goal is always to provide you with excellent care. Hearing back from our patients is one way we can continue to improve our services. Please take a few minutes to complete the written survey that you may receive in the mail after your visit with us. Thank you!             Your Updated Medication List - Protect others around you: Learn how to safely use, store and throw away your medicines at www.disposemymeds.org.          This list is accurate as of 7/30/18 11:01 AM.  Always use your most recent med list.                   Brand Name Dispense Instructions for use Diagnosis    ketotifen 0.025 % Soln ophthalmic solution    ZADITOR/REFRESH ANTI-ITCH     1 drop 2 times daily        sertraline 25 MG tablet    ZOLOFT    90 tablet    Take 1 tablet (25 mg) by mouth daily    Generalized anxiety disorder       traZODone 100 MG tablet    DESYREL    45 tablet    Take 0.5 tablets (50 mg) by mouth nightly as needed  for sleep Note change in pill strength (backorder)    Insomnia, unspecified type

## 2018-07-30 NOTE — PATIENT INSTRUCTIONS
1.  You were seen in the ENT Clinic today by Dr. Mcintosh.  If you have any questions or concerns after your appointment, please call [phone].  Press option #1 for scheduling related needs.  Press option #3 for Nurse advice.  2.  Please initiate speech therapy at the [facility] - [facility].  Call [facility] at [phone] if you require assistance in scheduling.      Madison SPEARSN, RN  [facility]   Head & Neck Surgery        What Is The Reason For Today's Visit?: Surveillance against skin cancer recurrences Year Excised?: 2002

## 2018-07-30 NOTE — MR AVS SNAPSHOT
After Visit Summary   7/30/2018    Alessio Dallas    MRN: [id number]           Patient Information     Date Of Birth          1970        Visit Information        Provider Department      7/30/2018 9:40 AM Wes Forde SLP [facility]        Today's Diagnoses     Muscle tension dysphonia    -  1    Throat discomfort           Follow-ups after your visit        Your next 10 appointments already scheduled     Aug 15, 2018  1:00 PM CDT   (Arrive by 12:45 PM)   NEW SLEEP VOICE with [facility] ([facility])    [address] [place] [address] [phone]              Who to contact     Please call your clinic at [phone] to:    Ask questions about your health    Make or cancel appointments    Discuss your medicines    Learn about your test results    Speak to your doctor            Additional Information About Your Visit        MyChart Information     [employer] gives you secure access to your electronic health record. If you see a primary care provider, you can also send messages to your care team and make appointments. If you have questions, please call your primary care clinic.  If you do not have a primary care provider, please call [phone] and they will assist you.      [employer] is an electronic gateway that provides easy, online access to your medical records. With [employer], you can request a clinic appointment, read your test results, renew a prescription or communicate with your care team.     To access your existing account, please contact your [facility] or call [phone] for assistance.        Care EveryWhere ID     This is your Care EveryWhere ID. This could be used by other organizations to access your [place] medical records  [id number]         Blood Pressure from Last 3 Encounters:   03/29/18 112/74   02/17/17 113/77   10/20/15 107/72    Weight from Last 3  Encounters:   07/30/18 71.7 kg (158 lb)   03/29/18 71.7 kg (158 lb)   02/17/17 71.3 kg (157 lb 1.3 oz)              We Performed the Following     C BEHAVIORAL & QUALITATIVE ANALYSIS VOICE AND RESONANCE     LARYNGEAL FUNCTION STUDIES        Primary Care Provider Office Phone # Fax #    Papo Carter [phone]       [address] [place] [address]        Equal Access to Services     SETH LENZ : Hadii aad ku hadasho Soomaali, waaxda luqadaha, qaybta kaalmada adeegyada, waxay idiin hayaan adeeg willowtobinnicolette alarcon . So [facility] [phone].    ATENCIÓN: Si benyla esppancho, tiene a jauregui disposición servicios gratuitos de asistencia lingüística. [facility] [phone].    We comply with applicable federal civil rights laws and [place] laws. We do not discriminate on the basis of race, color, national origin, age, disability, sex, sexual orientation, or gender identity.            Thank you!     Thank you for choosing [facility]  for your care. Our goal is always to provide you with excellent care. Hearing back from our patients is one way we can continue to improve our services. Please take a few minutes to complete the written survey that you may receive in the mail after your visit with us. Thank you!             Your Updated Medication List - Protect others around you: Learn how to safely use, store and throw away your medicines at www.disposemymeds.org.          This list is accurate as of 7/30/18 11:37 AM.  Always use your most recent med list.                   Brand Name Dispense Instructions for use Diagnosis    ketotifen 0.025 % Soln ophthalmic solution    ZADITOR/REFRESH ANTI-ITCH     1 drop 2 times daily        sertraline 25 MG tablet    ZOLOFT    90 tablet    Take 1 tablet (25 mg) by mouth daily    Generalized anxiety disorder       traZODone 100 MG tablet    DESYREL    45 tablet    Take 0.5 tablets (50 mg) by mouth nightly as needed for sleep Note change in pill strength  (backorder)    Insomnia, unspecified type

## 2018-07-30 NOTE — LETTER
7/30/2018       RE: Alessio Dallas  [address] [address]     Dear Colleague,    Thank you for referring your patient, Alessio Dallas, to the [facility] EAR NOSE AND THROAT at [facility]. Please see a copy of my visit note below.    Dear Dr. Carter:    I had the pleasure of meeting Alessio Dallas in consultation at the [facility] of the [facility] at your request, for evaluation of dysphonia. The note from our visit follows. Speech recognition software may have been used in the documentation below; input is reviewed before signature to the best of my ability. I appreciate the opportunity to participate in the care of this pleasant patient.    Please feel free to contact me with any questions.    Sincerely yours,    Savanna Mcintosh M.D., M.P.H.  , Laryngology  Director, [facility]  Otolaryngology- Head & Neck Surgery  [phone]          =====    History of Present Illness:   Alessio Dallas is a pleasant 48-year-old male with a history of anxiety, irritable bowel, and seasonal allergies  who presents with a seven month history of throat concerns. Symptoms include increased effort to talk/sing, throat irritation, throat tightness, pain/ache in throat, dry throat, mucus in throat, voice breaks, change in vocal pitch, change in vocal volume, change in range, gravelly voice and raspy voice.    Voice  He reports a seven month history of voice problems that began during times of high anxiety and stress.  The problem began suddenly.  His voice is worse with stress although it can be variable and unpredictable. He has transient aphonic breaks, which seems to be less of a problem with oral intake. It does seem like voice use can worsen his symptoms. At these times he also has throat discomfort.  The problem seems stable over time.  His typical voice use is  intermittent.  He considers this to be a moderate problem.  He does have moderately elevated speaking vocal effort.  His voice is sometimes normal.  He did try over the counter course of Prilosec without any improvement; in fact he thinks his voice may have been slightly worse during that time. He thinks his voice is a little higher than his usual baseline. His voice is his primary concern.      Swallowing  He also feels like he has a dry throat which bothers him when he swallows. Things rarely go down the wrong tube. He does not experience increased swallowing effort with any texture(s).      Cough/Throat-clearing  He also sometimes has a cough but this is not a major concern.      Breathing  No concerns.       Throat discomfort  He has also had throat discomfort over the past seven months, which does seem related to stress. This is aching and does not really feel like a globus sensation.      Reflux-type symptoms  He experiences heartburn/indigestion rarely. He is not taking reflux medications.      Prior EPIC records were reviewed for this visit.    MEDICATIONS:     Current Outpatient Prescriptions   Medication Sig Dispense Refill     ketotifen (ZADITOR/REFRESH ANTI-ITCH) 0.025 % SOLN ophthalmic solution 1 drop 2 times daily       sertraline (ZOLOFT) 25 MG tablet Take 1 tablet (25 mg) by mouth daily 90 tablet 4     traZODone (DESYREL) 100 MG tablet Take 0.5 tablets (50 mg) by mouth nightly as needed for sleep Note change in pill strength (backorder) 45 tablet 4       ALLERGIES:    Allergies   Allergen Reactions     Seasonal Allergies        PAST MEDICAL HISTORY:   Past Medical History:   Diagnosis Date     Anxiety      Seasonal allergies         PAST SURGICAL HISTORY: No past surgical history on file.    HABITS/SOCIAL HISTORY:    Social History   Substance Use Topics     Smoking status: Never Smoker     Smokeless tobacco: Never Used     Alcohol use Yes      Comment: Sometime         FAMILY HISTORY:    Family  History   Problem Relation Age of Onset     Macular Degeneration Maternal Grandmother      Myocardial Infarction Paternal Grandmother      Glaucoma No family hx of      Retinal detachment No family hx of      Amblyopia No family hx of        REVIEW OF SYSTEMS:  The patient completed a comprehensive 11 point review of systems (below), which was reviewed. Positives are as noted below; pertinent findings are as noted in the HPI.     Patient Supplied Answers to Review of Systems   ENT ROS 7/30/2018   Psychology Frequently feeling anxious   Ears, Nose, Throat Nasal congestion or drainage, Sore throat, Hoarseness   Allergy/Immunology Allergies or hay fever       PHYSICAL EXAMINATION:  General: The patient was alert and conversant, and in no acute distress.    Eyes: PERRL, conjunctiva and lids normal, sclera nonicteric.  Nose: Anterior rhinoscopy: no gross abnormalities. no  bleeding; no  mucopurulence; septum grossly normal, mild mucoid drainage and/or crusting.  Oral cavity/oropharynx: No masses or lesions. Dentition in good condition. Floor of mouth and oral tongue soft to palpation. Tongue mobility and palate elevation intact and symmetric.  Ears: Normal auricles, external auditory canals bilaterally. Visualized portions of tympanic membranes normal bilaterally.   Neck: No palpable cervical lymphadenopathy. There was moderate  tenderness to palpation of the thyrohyoid space, which was narrow. No obvious thyroid abnormality. Landmarks palpable.  Resp: Breathing comfortably, no stridor or stertor.  Neuro: Symmetric facial function. Other cranial nerves as documented above.  Psych: Normal affect, pleasant and cooperative.  Voice/speech: Mild dysphonia characterized by breathiness, roughness and strain.  Extremities: No cyanosis, clubbing, or edema of the upper extremities.    Intake scores  Total Score for Last Patient-Answered VHI Questionnaire  VHI Total Score 7/30/2018   VHI Total Score 18     Total Score for Last  Patient-Answered EAT Questionnaire  EAT Total Score 7/30/2018   EAT Total Score 1     Total Score for Last Patient-Answered CSI Questionnaire  CSI Total Score 7/30/2018   CSI Total Score 0         PROCEDURE:   Flexible fiberoptic laryngoscopy and laryngovideostroboscopy  Indications: This procedure was warranted to evaluate the patient's laryngeal anatomy and function. Risks, benefits, and alternatives were discussed.  Description: After written informed consent was obtained, a time-out was performed to confirm patient identity, procedure, and procedure site. Topical 3% lidocaine with 0.25% phenylephrine was applied to the nasal cavities. I performed the endoscopy and no complications were apparent. Continuous and stroboscopic light were utilized to assess routine phonation and variable frequency phonation.  Performed by: Savanna Mcintosh MD MPH  SLP: Alphonse Forde MM, MA, CCC-SLP  Findings: Normal nasopharynx. Normal base of tongue, valleculae, and epiglottis. Vocal fold mobility: right: normal; left: normal. Medial edges of the vocal folds: smooth and straight, though held in a slightly bowed configuration at times. No focal mucosal lesions were observed on the true vocal folds. Glissade produced appropriate elongation. There was moderate supraglottic recruitment with connected speech. Mucosa of false vocal folds, aryepiglottic folds, piriform sinuses, and posterior glottis unremarkable. Airway was patent. Response to the therapy probes was good. No focal lesions on NBI.    The addition of stroboscopy allowed evaluation of the mucosal wave.   Amplitude: right: perhaps mildly decreased; left: normal. Symmetry: chasing asymmetry. Closure pattern: normal. Closure plane: at glottic level. Phase distribution: normal.      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  This is exacerbated by anxiety and stress, but he does indicate having a good support  structure for managing his psychological concerns.     I recommended speech therapy as initial primary management, with goals including improving laryngeal efficiency and improving respiratory/phonatory coordination.  We discussed that if his throat discomfort persists despite improving his laryngeal efficiency, a next step may be a gastroenterology referral for evaluation of his esophagus as a potential source of discomfort.     He will return as needed. I appreciate the opportunity to participate in the care of this pleasant patient.              Again, thank you for allowing me to participate in the care of your patient.      Sincerely,    Savanna Mcintosh MD

## 2018-07-30 NOTE — LETTER
7/30/2018      RE: Alessio Dallas  [address] [address]       [facility]ICE [facility]  Evaluation report    Clinician: Alphonse Forde M.M., M.A., CCC/SLP  Seen in conjunction with: Dr. Mcintosh  Referring physician:  Dr. Carter  Patient: Alessio Dallas  Date of Visit: 7/30/2018    HISTORY  Chief complaint: Alessio Dallas is a 48 year old gentleman presenting today for evaluation of throat pain, hoarseness, swallowing difficulty, and cough.    Onset: Suddenly 7 months ago  Inciting incident: none, though reports it was a time of high anxiety / stress  Course: Stable  Salient history: He has a history significant for CLAU (taking sertraline and trazodone).  Beginning 7 months ago he started experiencing transient periods of aphonia. Taking a sip of water seems to help it recover, also if he is eating it is less likely to happen.  He also notes an aching which he localizes to the larynx (bilaterally) and this seems to track with his voice symptoms. Tried an empiric course of prilosec without benefit.     CURRENT SYMPTOMS INCLUDE  VOICE    Increased effort to use voice    Voice breaks (primary issue)    Changes in pitch and volume and range    Worse with stress    Describes voice as gravelly / scratchy    He describes this as a moderate problem    He feels that his baseline voice may also be slightly different than it was historically (higher)    COUGH/THROAT CLEARING    Mucus in throat     SWALLOWING    Began shortly after voice symptoms    Dry throat    Improved over time    Minimal problem    ADDITIONAL    Throat discomfort    Aching sensation    Began in conjunction with voice concerns    Patient denies significant dyspnea, dysphagia and cough.     OTHER PERTINENT HISTORY    Otherwise unknown.  Please also refer to Dr. Mcintosh's dictation.     Past Medical History:   Diagnosis Date     Anxiety      Seasonal allergies      No past surgical history on file.    OBJECTIVE  PATIENT REPORTED  "MEASURES    Effort to talk: 6 / 10 (0-10 in which 10 represents maximal effort)    Voice quality: 4 / 10 (0-10 in which 10 represents best possible voice)  Patient Supplied Answers To VHI Questionnaire  Voice Handicap Index (VHI-10) 7/30/2018   My voice makes it difficult for people to hear me 2   People have difficulty understanding me in a noisy room 2   My voice difficulties restrict my personal and social life.  2   I feel left out of conversations because of my voice 2   My voice problem causes me to lose income 0   I feel as though I have to strain to produce voice 2   The clarity of my voice is unpredictable 2   My voice problem upsets me 3   My voice makes me feel handicapped 2   People ask, \"What's wrong with your voice?\" 1   VHI-10 18       Patient Supplied Answers To CSI Questionnaire  Cough Severity Index (CSI) 7/30/2018   My cough is worse when I lie down 0   My coughing problem causes me to restrict my personal and social life 0   I tend to avoid places because of my cough problem 0   I feel embarrassed because of my coughing problem 0   People ask, ''What's wrong?'' because I cough a lot 0   I run out of air when I cough 0   My coughing problem affects my voice 0   My coughing problem limits my physical activity 0   My coughing problem upsets me 0   People ask me if I am sick because I cough a lot 1   CSI Score 1       Patient Supplied Answers To EAT Questionnaire  Eating Assessment Tool (EAT-10) 7/30/2018   My swallowing problem has caused me to lose weight 0   My swallowing problem interferes with my ability to go out for meals 0   Swallowing liquids takes extra effort 0   Swallowing solids takes extra effort 0   Swallowing pills takes extra effort 0   Swallowing is painful 1   The pleasure of eating is affected by my swallowing 0   When I swallow food sticks in my throat 0   I cough when I eat 0   Swallowing is stressful 0   EAT-10 1     PERCEPTUAL EVALUATION (CPT 96069)  POSTURE / TENSION: " "    jaw    neck    BREATHING:     phonation is not coordinated with respiration    extended breath groups in speech    LARYNGEAL PALPATION:     tenderness of the thyrohyoid area    reduced thyrohyoid space     Palpation at TH was in line with where he feels aching / soreness, though it is not typically that focal    VOICE:    Roughness: Mild to moderate Intermittent    Breathiness: Mild Intermittent    Strain: Mild to moderate Intermittent    Loudness    Conversational speech:  WNL    Projected speech:  WNL    Pitch:    Conversational speech:  WNL    Pitch glide:     Some increased strain with strain with elevated F0    Adequate access to loft register    No consistent breaks    Resonance:    Conversational speech:  laryngeal pharyngeal resonance    Singing vs. Speech:  Consistent across contexts    CAPE-V Overall Severity:  17/100    COUGH/THROAT CLEARING:    Not observed    THERAPY PROBES: Improvement was elicited with use of forward resonant stimuli  ____________________________________________________________________  Laryngeal Function Studies (CPT 00038)  Acoustic measures:  Fundamental frequency Metrics     /a/ mean F0 = 117 Hz (SD = 0.95 Hz)     /i/ mean F0 = 175 Hz (SD = 0.85 Hz)     [place] Passage Mean f0 = 114 Hz (SD 20.68 Hz)     Glide:         Min F0 = 55 Hz        Max F0 = 514 Hz        Range = 459 Hz    Cepstral Measures     CPPS /a/ = 30.68 dB     CPPS /i/ = 26.67 dB     CPPS \"all voiced\" = 23.93 dB     AVQI (v.3.01) = 2.10    Additional Measures     Harmonic to Noise Ratio /a/ = 22.70 dB     Harmonic to Noise Ratio: [place] passage = 11.59 dB     Jitter (local) /a/ = 0.591 %     Shimmer (local) /a/ = 2.167 %    Aerodynamic Measures     Protocol / Parameter Result Normative Mean (SD)   Vital Capacity     Expiratory Volume 5.09 Liters 3.65 (1.09) Liters   Comfortable Sustained Phonation     Mean SPL 73.77 dB 74.59 (4.23) dB   Mean Pitch 131.15 Hz 121.14 (26.89) Hz   Peak Expiratory Airflow 0.234 " Lit/Sec 0.15 (0.05) Lit/Sec   Mean Expiratory Airflow 0.179 Lit/Sec 0.11 (0.05) Lit/Sec   Voicing Efficiency     Peak Air Pressure 13.45 cm H2O 8.64 (2.23) cm H2O   Mean Peak Air Pressure 12.83 cm H2O 6.91 (1.68) cm H2O   Peak Expiratory Airflow 2.154 Lit/Sec 0.32 (0.15) Lit/Sec   Mean Airflow During Voicing 0.761 Lit/Sec 0.19 (0.1) Lit/Sec   Running Speech: All Voiced Stimulus     Mean SPL 71.86 dB    Mean Pitch 122.98 Hz    Peak Expiratory Airflow 0.385 Lit/Sec    Mean Expiratory Airflow 0.194 Lit/Sec    Mean Airflow During Voicing 0.208 Lit/Sec    Running Speech: [place] Passage     Mean SPL 67.08 dB    SPL Range 41.24 dB    Mean Pitch 123.22 Hz    Pitch Range 258.11 Hz    Peak Expiratory Airflow 1.227 Lit/Sec    Mean Expiratory Airflow 0.242 Lit/Sec    Expiratory Volume 4.2 Liters    Mean Airflow During Voicing 0.25 Lit/Sec    Peak Inspiratory Airflow -2.817 Lit/Sec    Inspiratory Volume -1.67 Liters    ____________________________________________________________________    LARYNGEAL EXAMINATION  Procedure: Flexible endoscopy with chip-tip technology with stroboscopy, right nostril; topical anesthesia with 3% Lidocaine and 0.25% phenylephrine was applied.   Performed by: Dr. Mcintohs   The laryngeal and pharyngeal structures were evaluated for gross appearance, mobility, function, and focal lesions / abnormalities of the associated mucosa.  Stroboscopy was warranted to evaluate closure, symmetry, and vibratory characteristics of the vocal folds.  All findings were within normal limits with the exception of the following salient features:     Essentially healthy laryngeal and vocal fold mucosa    Subtly concavity of the vibratory margins bilaterally; however this appears more a result of muscle pattern as opposed to physiology based on response to therapeutic probes    Significant ventricular recruitment during running speech (L>R) with AP hyperfunction to a lesser degree    Improved glottic configuration with  forward resonant stimuli    Stroboscopy demonstrates frequent chasing asymmetry, subtle reduction of the left true vocal fold mucosal wave, but otherwise adequate closure.      Supraglottic hyperfunction during running speech      NBI of vocal folds    The laryngeal exam was reviewed with Mr. Dallas, and I provided pertinent explanations, as well as written and oral information.    ASSESSMENT / PLAN  IMPRESSIONS: Alessio aDllas is presenting today with R49.0 (Dysphonia) and R07.0 (Throat Discomfort) in the context of an imbalance in function of the intrinsic and extrinsic muscles of the larynx and longstanding anxiety. Laryngeal exam demonstrates subtle concavity of the vibratory margins of the vocal folds bilaterally which in conjunction with marked supraglottic hyperfunction seen during phonatory tasks are indicative of a pattern of nonoptimal muscular recruitment as opposed to physiologic deficit.  Laryngeal function studies showed good phonatory respiratory coordination, but significantly increased inferred subglottal pressure.  This is exacerbated by extended breath groups during running speech noted both during laryngeal function studies and perceptual evaluation.    STIMULABILITY: results of therapy probes during perceptual and laryngeal evaluation demonstrate improvement with use of forward resonant stimuli    RECOMMENDATIONS:     A course of speech therapy is recommended to optimize vocal technique, improve voice quality, promote reduced discomfort, effort and fatigue and help reduce Hyperfunctional patterns of laryngeal activation which contributes to throat discomfort.    He demonstrates a Good prognosis for improvement given adherence to therapeutic recommendations.     Positive indicators: positive response to therapy probes diagnosis is known to respond to treatment    Negative indicators: none    DURATION / FREQUENCY: 5 biweekly and 2 monthly one-hour sessions    Ongoing research in the area of voice  disorders, particularly related to MTD, was discussed with the patient and he stated that he would be happy to be contacted if he meets criteria for this or other studies.    GOALS:  Patient goal:   1. To improve and maintain a healthy voice quality  2. To understand the problem and fix it as much as possible    Short-term goal(s): Within the first 4 sessions, Mr. Dallas:  1. will demonstrate assigned laryngeal massage techniques with 80% accuracy or better with no clinician support  2. will be able to independently list key factors in maintenance of good vocal hygiene with 80% accuracy, and report on their use outside the therapy room.  3. will speak into expiratory reserve volume no more than 1 time(s) during a two minute conversation.  4. will accurately identify target vs. habitual voice quality during therapy tasks in 4 out of 5 trials with no clinician support  5. will demonstrate the ability to alternate between target and habitual voice quality given clinician cue 75% of the time during therapy tasks    Long-term goal(s): In 6 months, Mr. Dallas will:  1. Report a week of typical vocal activities, in which dysphonia and Discomfort do not exceed a level of 2 out of 10, 80% of the time     This treatment plan was developed with the patient who agreed with the recommendations.      TOTAL SERVICE TIME: 70 minutes  EVALUATION OF VOICE AND RESONANCE (34932)  LARYNGEAL FUNCTION STUDIES (74113)  NO CHARGE FACILITY FEE (96012)    Alphonse Forde M.M., M.A., CCC-SLP  Speech-Language Pathologist  Certificate of Vocology  [phone]    *this report was created in part through the use of computerized dictation software, and though reviewed following completion, some typographic errors may persist.  If there is confusion regarding any of this notes contents, please contact me for clarification.*

## 2018-07-30 NOTE — PROGRESS NOTES
Dear Dr. Carter:    I had the pleasure of meeting Alessio Dallas in consultation at the [facility] of the [facility] at your request, for evaluation of dysphonia. The note from our visit follows. Speech recognition software may have been used in the documentation below; input is reviewed before signature to the best of my ability. I appreciate the opportunity to participate in the care of this pleasant patient.    Please feel free to contact me with any questions.    Sincerely yours,    Savanna Mcintosh M.D., M.P.H.  , Laryngology  Director, [facility]  Otolaryngology- Head & Neck Surgery  [phone]          =====    History of Present Illness:   Alessio Dallas is a pleasant 48-year-old male with a history of anxiety, irritable bowel, and seasonal allergies  who presents with a seven month history of throat concerns. Symptoms include increased effort to talk/sing, throat irritation, throat tightness, pain/ache in throat, dry throat, mucus in throat, voice breaks, change in vocal pitch, change in vocal volume, change in range, gravelly voice and raspy voice.    Voice  He reports a seven month history of voice problems that began during times of high anxiety and stress.  The problem began suddenly.  His voice is worse with stress although it can be variable and unpredictable. He has transient aphonic breaks, which seems to be less of a problem with oral intake. It does seem like voice use can worsen his symptoms. At these times he also has throat discomfort.  The problem seems stable over time.  His typical voice use is intermittent.  He considers this to be a moderate problem.  He does have moderately elevated speaking vocal effort.  His voice is sometimes normal.  He did try over the counter course of Prilosec without any improvement; in fact he thinks his voice may have been slightly worse during that time. He  thinks his voice is a little higher than his usual baseline. His voice is his primary concern.      Swallowing  He also feels like he has a dry throat which bothers him when he swallows. Things rarely go down the wrong tube. He does not experience increased swallowing effort with any texture(s).      Cough/Throat-clearing  He also sometimes has a cough but this is not a major concern.      Breathing  No concerns.       Throat discomfort  He has also had throat discomfort over the past seven months, which does seem related to stress. This is aching and does not really feel like a globus sensation.      Reflux-type symptoms  He experiences heartburn/indigestion rarely. He is not taking reflux medications.      Prior EPIC records were reviewed for this visit.    MEDICATIONS:     Current Outpatient Prescriptions   Medication Sig Dispense Refill     ketotifen (ZADITOR/REFRESH ANTI-ITCH) 0.025 % SOLN ophthalmic solution 1 drop 2 times daily       sertraline (ZOLOFT) 25 MG tablet Take 1 tablet (25 mg) by mouth daily 90 tablet 4     traZODone (DESYREL) 100 MG tablet Take 0.5 tablets (50 mg) by mouth nightly as needed for sleep Note change in pill strength (backorder) 45 tablet 4       ALLERGIES:    Allergies   Allergen Reactions     Seasonal Allergies        PAST MEDICAL HISTORY:   Past Medical History:   Diagnosis Date     Anxiety      Seasonal allergies         PAST SURGICAL HISTORY: No past surgical history on file.    HABITS/SOCIAL HISTORY:    Social History   Substance Use Topics     Smoking status: Never Smoker     Smokeless tobacco: Never Used     Alcohol use Yes      Comment: Sometime         FAMILY HISTORY:    Family History   Problem Relation Age of Onset     Macular Degeneration Maternal Grandmother      Myocardial Infarction Paternal Grandmother      Glaucoma No family hx of      Retinal detachment No family hx of      Amblyopia No family hx of        REVIEW OF SYSTEMS:  The patient completed a comprehensive 11  point review of systems (below), which was reviewed. Positives are as noted below; pertinent findings are as noted in the HPI.     Patient Supplied Answers to Review of Systems   ENT ROS 7/30/2018   Psychology Frequently feeling anxious   Ears, Nose, Throat Nasal congestion or drainage, Sore throat, Hoarseness   Allergy/Immunology Allergies or hay fever       PHYSICAL EXAMINATION:  General: The patient was alert and conversant, and in no acute distress.    Eyes: PERRL, conjunctiva and lids normal, sclera nonicteric.  Nose: Anterior rhinoscopy: no gross abnormalities. no  bleeding; no  mucopurulence; septum grossly normal, mild mucoid drainage and/or crusting.  Oral cavity/oropharynx: No masses or lesions. Dentition in good condition. Floor of mouth and oral tongue soft to palpation. Tongue mobility and palate elevation intact and symmetric.  Ears: Normal auricles, external auditory canals bilaterally. Visualized portions of tympanic membranes normal bilaterally.   Neck: No palpable cervical lymphadenopathy. There was moderate  tenderness to palpation of the thyrohyoid space, which was narrow. No obvious thyroid abnormality. Landmarks palpable.  Resp: Breathing comfortably, no stridor or stertor.  Neuro: Symmetric facial function. Other cranial nerves as documented above.  Psych: Normal affect, pleasant and cooperative.  Voice/speech: Mild dysphonia characterized by breathiness, roughness and strain.  Extremities: No cyanosis, clubbing, or edema of the upper extremities.    Intake scores  Total Score for Last Patient-Answered VHI Questionnaire  VHI Total Score 7/30/2018   VHI Total Score 18     Total Score for Last Patient-Answered EAT Questionnaire  EAT Total Score 7/30/2018   EAT Total Score 1     Total Score for Last Patient-Answered CSI Questionnaire  CSI Total Score 7/30/2018   CSI Total Score 0         PROCEDURE:   Flexible fiberoptic laryngoscopy and laryngovideostroboscopy  Indications: This procedure was  warranted to evaluate the patient's laryngeal anatomy and function. Risks, benefits, and alternatives were discussed.  Description: After written informed consent was obtained, a time-out was performed to confirm patient identity, procedure, and procedure site. Topical 3% lidocaine with 0.25% phenylephrine was applied to the nasal cavities. I performed the endoscopy and no complications were apparent. Continuous and stroboscopic light were utilized to assess routine phonation and variable frequency phonation.  Performed by: Savanna Mcintosh MD MPH  SLP: Alphonse Forde MM, MA, CCC-SLP  Findings: Normal nasopharynx. Normal base of tongue, valleculae, and epiglottis. Vocal fold mobility: right: normal; left: normal. Medial edges of the vocal folds: smooth and straight, though held in a slightly bowed configuration at times. No focal mucosal lesions were observed on the true vocal folds. Glissade produced appropriate elongation. There was moderate supraglottic recruitment with connected speech. Mucosa of false vocal folds, aryepiglottic folds, piriform sinuses, and posterior glottis unremarkable. Airway was patent. Response to the therapy probes was good. No focal lesions on NBI.    The addition of stroboscopy allowed evaluation of the mucosal wave.   Amplitude: right: perhaps mildly decreased; left: normal. Symmetry: chasing asymmetry. Closure pattern: normal. Closure plane: at glottic level. Phase distribution: normal.      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  This is exacerbated by anxiety and stress, but he does indicate having a good support structure for managing his psychological concerns.     I recommended speech therapy as initial primary management, with goals including improving laryngeal efficiency and improving respiratory/phonatory coordination.  We discussed that if his throat discomfort persists despite improving his laryngeal  efficiency, a next step may be a gastroenterology referral for evaluation of his esophagus as a potential source of discomfort.     He will return as needed. I appreciate the opportunity to participate in the care of this pleasant patient.

## 2018-07-30 NOTE — PROGRESS NOTES
[facility]  Evaluation report    Clinician: Alphonse Forde M.M., M.A., CCC/SLP  Seen in conjunction with: Dr. Mcintosh  Referring physician:  Dr. Carter  Patient: Alessio Dallas  Date of Visit: 7/30/2018    HISTORY  Chief complaint: Alessio Dallas is a 48 year old gentleman presenting today for evaluation of throat pain, hoarseness, swallowing difficulty, and cough.    Onset: Suddenly 7 months ago  Inciting incident: none, though reports it was a time of high anxiety / stress  Course: Stable  Salient history: He has a history significant for CLAU (taking sertraline and trazodone).  Beginning 7 months ago he started experiencing transient periods of aphonia. Taking a sip of water seems to help it recover, also if he is eating it is less likely to happen.  He also notes an aching which he localizes to the larynx (bilaterally) and this seems to track with his voice symptoms. Tried an empiric course of prilosec without benefit.     CURRENT SYMPTOMS INCLUDE  VOICE    Increased effort to use voice    Voice breaks (primary issue)    Changes in pitch and volume and range    Worse with stress    Describes voice as gravelly / scratchy    He describes this as a moderate problem    He feels that his baseline voice may also be slightly different than it was historically (higher)    COUGH/THROAT CLEARING    Mucus in throat     SWALLOWING    Began shortly after voice symptoms    Dry throat    Improved over time    Minimal problem    ADDITIONAL    Throat discomfort    Aching sensation    Began in conjunction with voice concerns    Patient denies significant dyspnea, dysphagia and cough.     OTHER PERTINENT HISTORY    Otherwise unknown.  Please also refer to Dr. Mcintosh's dictation.     Past Medical History:   Diagnosis Date     Anxiety      Seasonal allergies      No past surgical history on file.    OBJECTIVE  PATIENT REPORTED MEASURES    Effort to talk: 6 / 10 (0-10 in which 10 represents maximal effort)    Voice quality:  "4 / 10 (0-10 in which 10 represents best possible voice)  Patient Supplied Answers To VHI Questionnaire  Voice Handicap Index (VHI-10) 7/30/2018   My voice makes it difficult for people to hear me 2   People have difficulty understanding me in a noisy room 2   My voice difficulties restrict my personal and social life.  2   I feel left out of conversations because of my voice 2   My voice problem causes me to lose income 0   I feel as though I have to strain to produce voice 2   The clarity of my voice is unpredictable 2   My voice problem upsets me 3   My voice makes me feel handicapped 2   People ask, \"What's wrong with your voice?\" 1   VHI-10 18       Patient Supplied Answers To CSI Questionnaire  Cough Severity Index (CSI) 7/30/2018   My cough is worse when I lie down 0   My coughing problem causes me to restrict my personal and social life 0   I tend to avoid places because of my cough problem 0   I feel embarrassed because of my coughing problem 0   People ask, ''What's wrong?'' because I cough a lot 0   I run out of air when I cough 0   My coughing problem affects my voice 0   My coughing problem limits my physical activity 0   My coughing problem upsets me 0   People ask me if I am sick because I cough a lot 1   CSI Score 1       Patient Supplied Answers To EAT Questionnaire  Eating Assessment Tool (EAT-10) 7/30/2018   My swallowing problem has caused me to lose weight 0   My swallowing problem interferes with my ability to go out for meals 0   Swallowing liquids takes extra effort 0   Swallowing solids takes extra effort 0   Swallowing pills takes extra effort 0   Swallowing is painful 1   The pleasure of eating is affected by my swallowing 0   When I swallow food sticks in my throat 0   I cough when I eat 0   Swallowing is stressful 0   EAT-10 1     PERCEPTUAL EVALUATION (CPT 04435)  POSTURE / TENSION:     jaw    neck    BREATHING:     phonation is not coordinated with respiration    extended breath groups " "in speech    LARYNGEAL PALPATION:     tenderness of the thyrohyoid area    reduced thyrohyoid space     Palpation at TH was in line with where he feels aching / soreness, though it is not typically that focal    VOICE:    Roughness: Mild to moderate Intermittent    Breathiness: Mild Intermittent    Strain: Mild to moderate Intermittent    Loudness    Conversational speech:  WNL    Projected speech:  WNL    Pitch:    Conversational speech:  WNL    Pitch glide:     Some increased strain with strain with elevated F0    Adequate access to loft register    No consistent breaks    Resonance:    Conversational speech:  laryngeal pharyngeal resonance    Singing vs. Speech:  Consistent across contexts    CAPE-V Overall Severity:  17/100    COUGH/THROAT CLEARING:    Not observed    THERAPY PROBES: Improvement was elicited with use of forward resonant stimuli  ____________________________________________________________________  Laryngeal Function Studies (CPT 01269)  Acoustic measures:  Fundamental frequency Metrics     /a/ mean F0 = 117 Hz (SD = 0.95 Hz)     /i/ mean F0 = 175 Hz (SD = 0.85 Hz)     [place] Mean f0 = 114 Hz (SD 20.68 Hz)     Glide:         Min F0 = 55 Hz        Max F0 = 514 Hz        Range = 459 Hz    Cepstral Measures     CPPS /a/ = 30.68 dB     CPPS /i/ = 26.67 dB     CPPS \"all voiced\" = 23.93 dB     AVQI (v.3.01) = 2.10    Additional Measures     Harmonic to Noise Ratio /a/ = 22.70 dB     Harmonic to Noise Ratio: [place] = 11.59 dB     Jitter (local) /a/ = 0.591 %     Shimmer (local) /a/ = 2.167 %    Aerodynamic Measures     Protocol / Parameter Result Normative Mean (SD)   Vital Capacity     Expiratory Volume 5.09 Liters 3.65 (1.09) Liters   Comfortable Sustained Phonation     Mean SPL 73.77 dB 74.59 (4.23) dB   Mean Pitch 131.15 Hz 121.14 (26.89) Hz   Peak Expiratory Airflow 0.234 Lit/Sec 0.15 (0.05) Lit/Sec   Mean Expiratory Airflow 0.179 Lit/Sec 0.11 (0.05) Lit/Sec   Voicing Efficiency   "   Peak Air Pressure 13.45 cm H2O 8.64 (2.23) cm H2O   Mean Peak Air Pressure 12.83 cm H2O 6.91 (1.68) cm H2O   Peak Expiratory Airflow 2.154 Lit/Sec 0.32 (0.15) Lit/Sec   Mean Airflow During Voicing 0.761 Lit/Sec 0.19 (0.1) Lit/Sec   Running Speech: All Voiced Stimulus     Mean SPL 71.86 dB    Mean Pitch 122.98 Hz    Peak Expiratory Airflow 0.385 Lit/Sec    Mean Expiratory Airflow 0.194 Lit/Sec    Mean Airflow During Voicing 0.208 Lit/Sec    Running Speech: [place]     Mean SPL 67.08 dB    SPL Range 41.24 dB    Mean Pitch 123.22 Hz    Pitch Range 258.11 Hz    Peak Expiratory Airflow 1.227 Lit/Sec    Mean Expiratory Airflow 0.242 Lit/Sec    Expiratory Volume 4.2 Liters    Mean Airflow During Voicing 0.25 Lit/Sec    Peak Inspiratory Airflow -2.817 Lit/Sec    Inspiratory Volume -1.67 Liters    ____________________________________________________________________    LARYNGEAL EXAMINATION  Procedure: Flexible endoscopy with chip-tip technology with stroboscopy, right nostril; topical anesthesia with 3% Lidocaine and 0.25% phenylephrine was applied.   Performed by: Dr. Mcintosh   The laryngeal and pharyngeal structures were evaluated for gross appearance, mobility, function, and focal lesions / abnormalities of the associated mucosa.  Stroboscopy was warranted to evaluate closure, symmetry, and vibratory characteristics of the vocal folds.  All findings were within normal limits with the exception of the following salient features:     Essentially healthy laryngeal and vocal fold mucosa    Subtly concavity of the vibratory margins bilaterally; however this appears more a result of muscle pattern as opposed to physiology based on response to therapeutic probes    Significant ventricular recruitment during running speech (L>R) with AP hyperfunction to a lesser degree    Improved glottic configuration with forward resonant stimuli    Stroboscopy demonstrates frequent chasing asymmetry, subtle reduction of the left  true vocal fold mucosal wave, but otherwise adequate closure.      Supraglottic hyperfunction during running speech      NBI of vocal folds    The laryngeal exam was reviewed with Mr. Dallas, and I provided pertinent explanations, as well as written and oral information.    ASSESSMENT / PLAN  IMPRESSIONS: Alessio Dallas is presenting today with R49.0 (Dysphonia) and R07.0 (Throat Discomfort) in the context of an imbalance in function of the intrinsic and extrinsic muscles of the larynx and longstanding anxiety. Laryngeal exam demonstrates subtle concavity of the vibratory margins of the vocal folds bilaterally which in conjunction with marked supraglottic hyperfunction seen during phonatory tasks are indicative of a pattern of nonoptimal muscular recruitment as opposed to physiologic deficit.  Laryngeal function studies showed good phonatory respiratory coordination, but significantly increased inferred subglottal pressure.  This is exacerbated by extended breath groups during running speech noted both during laryngeal function studies and perceptual evaluation.    STIMULABILITY: results of therapy probes during perceptual and laryngeal evaluation demonstrate improvement with use of forward resonant stimuli    RECOMMENDATIONS:     A course of speech therapy is recommended to optimize vocal technique, improve voice quality, promote reduced discomfort, effort and fatigue and help reduce Hyperfunctional patterns of laryngeal activation which contributes to throat discomfort.    He demonstrates a Good prognosis for improvement given adherence to therapeutic recommendations.     Positive indicators: positive response to therapy probes diagnosis is known to respond to treatment    Negative indicators: none    DURATION / FREQUENCY: 5 biweekly and 2 monthly one-hour sessions    Ongoing research in the area of voice disorders, particularly related to MTD, was discussed with the patient and he stated that he would be happy to  be contacted if he meets criteria for this or other studies.    GOALS:  Patient goal:   1. To improve and maintain a healthy voice quality  2. To understand the problem and fix it as much as possible    Short-term goal(s): Within the first 4 sessions, Mr. Dallas:  1. will demonstrate assigned laryngeal massage techniques with 80% accuracy or better with no clinician support  2. will be able to independently list key factors in maintenance of good vocal hygiene with 80% accuracy, and report on their use outside the therapy room.  3. will speak into expiratory reserve volume no more than 1 time(s) during a two minute conversation.  4. will accurately identify target vs. habitual voice quality during therapy tasks in 4 out of 5 trials with no clinician support  5. will demonstrate the ability to alternate between target and habitual voice quality given clinician cue 75% of the time during therapy tasks    Long-term goal(s): In 6 months, Mr. Dallas will:  1. Report a week of typical vocal activities, in which dysphonia and Discomfort do not exceed a level of 2 out of 10, 80% of the time     This treatment plan was developed with the patient who agreed with the recommendations.      TOTAL SERVICE TIME: 70 minutes  EVALUATION OF VOICE AND RESONANCE (21593)  LARYNGEAL FUNCTION STUDIES (47460)  NO CHARGE FACILITY FEE (17215)    Alphonse Forde M.M., M.A., CCC-SLP  Speech-Language Pathologist  Certificate of Vocology  [phone]    *this report was created in part through the use of computerized dictation software, and though reviewed following completion, some typographic errors may persist.  If there is confusion regarding any of this notes contents, please contact me for clarification.*

## 2018-07-30 NOTE — LETTER
7/30/2018      RE: Alessio Dallas  [address] [facility] [address]       Dear Dr. Carter:    I had the pleasure of meeting Alessio Dallas in consultation at the [facility] of the [facility] at your request, for evaluation of dysphonia. The note from our visit follows. Speech recognition software may have been used in the documentation below; input is reviewed before signature to the best of my ability. I appreciate the opportunity to participate in the care of this pleasant patient.    Please feel free to contact me with any questions.    Sincerely yours,    Savanna Mcintosh M.D., M.P.H.  , Laryngology  Director, [facility]  Otolaryngology- Head & Neck Surgery  [phone]          =====    History of Present Illness:   Alessio Dallas is a pleasant 48-year-old male with a history of anxiety, irritable bowel, and seasonal allergies  who presents with a seven month history of throat concerns. Symptoms include increased effort to talk/sing, throat irritation, throat tightness, pain/ache in throat, dry throat, mucus in throat, voice breaks, change in vocal pitch, change in vocal volume, change in range, gravelly voice and raspy voice.    Voice  He reports a seven month history of voice problems that began during times of high anxiety and stress.  The problem began suddenly.  His voice is worse with stress although it can be variable and unpredictable. He has transient aphonic breaks, which seems to be less of a problem with oral intake. It does seem like voice use can worsen his symptoms. At these times he also has throat discomfort.  The problem seems stable over time.  His typical voice use is intermittent.  He considers this to be a moderate problem.  He does have moderately elevated speaking vocal effort.  His voice is sometimes normal.  He did try over the counter course of Prilosec without any  improvement; in fact he thinks his voice may have been slightly worse during that time. He thinks his voice is a little higher than his usual baseline. His voice is his primary concern.      Swallowing  He also feels like he has a dry throat which bothers him when he swallows. Things rarely go down the wrong tube. He does not experience increased swallowing effort with any texture(s).      Cough/Throat-clearing  He also sometimes has a cough but this is not a major concern.      Breathing  No concerns.       Throat discomfort  He has also had throat discomfort over the past seven months, which does seem related to stress. This is aching and does not really feel like a globus sensation.      Reflux-type symptoms  He experiences heartburn/indigestion rarely. He is not taking reflux medications.      Prior EPIC records were reviewed for this visit.    MEDICATIONS:     Current Outpatient Prescriptions   Medication Sig Dispense Refill     ketotifen (ZADITOR/REFRESH ANTI-ITCH) 0.025 % SOLN ophthalmic solution 1 drop 2 times daily       sertraline (ZOLOFT) 25 MG tablet Take 1 tablet (25 mg) by mouth daily 90 tablet 4     traZODone (DESYREL) 100 MG tablet Take 0.5 tablets (50 mg) by mouth nightly as needed for sleep Note change in pill strength (backorder) 45 tablet 4       ALLERGIES:    Allergies   Allergen Reactions     Seasonal Allergies        PAST MEDICAL HISTORY:   Past Medical History:   Diagnosis Date     Anxiety      Seasonal allergies         PAST SURGICAL HISTORY: No past surgical history on file.    HABITS/SOCIAL HISTORY:    Social History   Substance Use Topics     Smoking status: Never Smoker     Smokeless tobacco: Never Used     Alcohol use Yes      Comment: Sometime         FAMILY HISTORY:    Family History   Problem Relation Age of Onset     Macular Degeneration Maternal Grandmother      Myocardial Infarction Paternal Grandmother      Glaucoma No family hx of      Retinal detachment No family hx of       Amblyopia No family hx of        REVIEW OF SYSTEMS:  The patient completed a comprehensive 11 point review of systems (below), which was reviewed. Positives are as noted below; pertinent findings are as noted in the HPI.     Patient Supplied Answers to Review of Systems   ENT ROS 7/30/2018   Psychology Frequently feeling anxious   Ears, Nose, Throat Nasal congestion or drainage, Sore throat, Hoarseness   Allergy/Immunology Allergies or hay fever       PHYSICAL EXAMINATION:  General: The patient was alert and conversant, and in no acute distress.    Eyes: PERRL, conjunctiva and lids normal, sclera nonicteric.  Nose: Anterior rhinoscopy: no gross abnormalities. no  bleeding; no  mucopurulence; septum grossly normal, mild mucoid drainage and/or crusting.  Oral cavity/oropharynx: No masses or lesions. Dentition in good condition. Floor of mouth and oral tongue soft to palpation. Tongue mobility and palate elevation intact and symmetric.  Ears: Normal auricles, external auditory canals bilaterally. Visualized portions of tympanic membranes normal bilaterally.   Neck: No palpable cervical lymphadenopathy. There was moderate  tenderness to palpation of the thyrohyoid space, which was narrow. No obvious thyroid abnormality. Landmarks palpable.  Resp: Breathing comfortably, no stridor or stertor.  Neuro: Symmetric facial function. Other cranial nerves as documented above.  Psych: Normal affect, pleasant and cooperative.  Voice/speech: Mild dysphonia characterized by breathiness, roughness and strain.  Extremities: No cyanosis, clubbing, or edema of the upper extremities.    Intake scores  Total Score for Last Patient-Answered VHI Questionnaire  VHI Total Score 7/30/2018   VHI Total Score 18     Total Score for Last Patient-Answered EAT Questionnaire  EAT Total Score 7/30/2018   EAT Total Score 1     Total Score for Last Patient-Answered CSI Questionnaire  CSI Total Score 7/30/2018   CSI Total Score 0         PROCEDURE:    Flexible fiberoptic laryngoscopy and laryngovideostroboscopy  Indications: This procedure was warranted to evaluate the patient's laryngeal anatomy and function. Risks, benefits, and alternatives were discussed.  Description: After written informed consent was obtained, a time-out was performed to confirm patient identity, procedure, and procedure site. Topical 3% lidocaine with 0.25% phenylephrine was applied to the nasal cavities. I performed the endoscopy and no complications were apparent. Continuous and stroboscopic light were utilized to assess routine phonation and variable frequency phonation.  Performed by: Savanna Mcintosh MD MPH  SLP: Alphonse Forde MM, MA, CCC-SLP  Findings: Normal nasopharynx. Normal base of tongue, valleculae, and epiglottis. Vocal fold mobility: right: normal; left: normal. Medial edges of the vocal folds: smooth and straight, though held in a slightly bowed configuration at times. No focal mucosal lesions were observed on the true vocal folds. Glissade produced appropriate elongation. There was moderate supraglottic recruitment with connected speech. Mucosa of false vocal folds, aryepiglottic folds, piriform sinuses, and posterior glottis unremarkable. Airway was patent. Response to the therapy probes was good. No focal lesions on NBI.    The addition of stroboscopy allowed evaluation of the mucosal wave.   Amplitude: right: perhaps mildly decreased; left: normal. Symmetry: chasing asymmetry. Closure pattern: normal. Closure plane: at glottic level. Phase distribution: normal.      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  This is exacerbated by anxiety and stress, but he does indicate having a good support structure for managing his psychological concerns.     I recommended speech therapy as initial primary management, with goals including improving laryngeal efficiency and improving respiratory/phonatory  coordination.  We discussed that if his throat discomfort persists despite improving his laryngeal efficiency, a next step may be a gastroenterology referral for evaluation of his esophagus as a potential source of discomfort.     He will return as needed. I appreciate the opportunity to participate in the care of this pleasant patient.              Savanna Mcintosh MD

## 2018-07-30 NOTE — NURSING NOTE
"Chief Complaint   Patient presents with     Consult     hoarseness     Height 1.854 m (6' 1\"), weight 71.7 kg (158 lb).    Anurag Manning    "

## 2018-08-10 DIAGNOSIS — F41.1 GAD (GENERALIZED ANXIETY DISORDER): Primary | ICD-10-CM

## 2018-08-10 NOTE — TELEPHONE ENCOUNTER
LV 3/29/18, no future visit    Prescription approved per [facility] Refill Protocol.  Increase to sertraline 50 mg daily per [facility] - Dr Carter direction

## 2018-08-15 ENCOUNTER — OFFICE VISIT (OUTPATIENT)
Dept: OTOLARYNGOLOGY | Facility: CLINIC | Age: 48
End: 2018-08-15
Payer: COMMERCIAL

## 2018-08-15 DIAGNOSIS — R49.0 DYSPHONIA: Primary | ICD-10-CM

## 2018-08-15 DIAGNOSIS — R07.0 THROAT DISCOMFORT: ICD-10-CM

## 2018-08-15 NOTE — LETTER
"8/15/2018      RE: Alessio Dallas  [address] [facility] [address]       [facility]  THERAPY NOTE (CPT 09203)    Patient: Alessio Dallas  Date of Service: 8/15/2018  Referring physician: Dr. Mcintosh  Impressions from most recent evaluation:  \"Alessio Dallas is presenting today with R49.0 (Dysphonia) and R07.0 (Throat Discomfort) in the context of an imbalance in function of the intrinsic and extrinsic muscles of the larynx and longstanding anxiety. Laryngeal exam demonstrates subtle concavity of the vibratory margins of the vocal folds bilaterally which in conjunction with marked supraglottic hyperfunction seen during phonatory tasks are indicative of a pattern of nonoptimal muscular recruitment as opposed to physiologic deficit.  Laryngeal function studies showed good phonatory respiratory coordination, but significantly increased inferred subglottal pressure.  This is exacerbated by extended breath groups during running speech noted both during laryngeal function studies and perceptual evaluation.\"    SUBJECTIVE:  Since the patient's last session, they report the following:     Overall symptoms are about the same    Clarification of goals:    Improved voice quality (reduced \"breaking\" of his voice)    Reduced throat discomfort    Not having to worry about his throat    OBJECTIVE:  PATIENT REPORTED MEASURES:  Patient Supplied Answers To SLP QOL Questionnaire  Therapy Quality of Life 8/15/2018   Since my l ast session, I used the speech therapy exercises and strategies as recommended by my speech pathologist. Not applicable   I feel that using my therapy techniques has become a habit Not applicable   I feel confident in my ability to manage my current and future symptoms. Neither agree nor disagree   Since my last session I feel my symptoms have --------. Stayed the same   Overall, since starting therapy I feel my symptoms are --------. About the same     Patient Specific Goal Metrics:  Dysponia " "SLP Goals 8/15/2018   How would you rate your speaking voice quality, if 0 is worst voice quality, and 10 is best voice? 5   How how severe is your [Throat Discomfort - or use patient specific description], if 0 is no [discomfort] at all and 10 is the worst [discomfort]? 5   How much does your voice problem bother you? Quite a bit     THERAPEUTIC ACTIVITIES    Instructed concepts and techniques for optimal vocal hygiene including:    Systemic hydration, including strategies for increasing daily water intake    Topical hydration - Gargling, saline nasal irrigation, humidification, steam, guaifenesin    Environmental barriers to healthy voicing - noise, inhaled irritants, room acoustics    Awareness and reduction of phonotraumatic behaviors    Moderating voice use    Substituting non-voice alternative behaviors    Avoiding cough and throat clearing    Exercises to promote reduced perilaryngeal muscle tension    Four way neck stretch instructed    Modifications for additional extension instructed    Base of tongue stretch instructed    Proper form for each stretch was emphasized, including:    Maintenance of posture for 10 breaths (~20-30 seconds)    Awareness of tension on inhalation with volitional relaxation into the stretch on exhalation    Avoidance of \"forcing\" a posture, only progressing far enough to feel the stretch    Manual Laryngeal massage was performed in combination with gentle forward resonant sounds    Significant tenderness of the thyrohyoid space with corresponding reduction of space     Thyrohyoid space, and base of tongue were targeted with gentle circular massage    Gentle lateralization of the larynx, and sternocleidomastoid massage was also performed.    Patient was trained to focus on intentional relaxation of jaw and tongue in addition to area of massage during these maneuvers.    Comfortably quiet forward resonant /m/ on descending glides was utilized throughout massage    Self-massage was " instructed and patient was able to demonstrate this with acceptable accuracy  Semi-Occluded Vocal Tract (SOVT) exercises instructed to reduce laryngeal tension, promote vocal fold pliability, and coordinate respiration and phonation    Straw with water resistance was found to be most facilitating     Sustained phonation, and voice vs. voiceless productions used to promote easy voicing and raise awareness of laryngeal tension    Ascending and descending glides utilized to promote vocal fold pliability    Patient demonstrated limited pitch accuracy both in sustained phonation and a descending glides    Instructed to use these exercises as a warm-up / cooldown, and to re-calibrate the voice throughout the day.    Good accuracy with moderate clinician support      A regimen for home practice was instructed.    I provided handouts of today's therapeutic activities to facilitate practice.    ASSESSMENT/PLAN  PROGRESS TOWARD LONG TERM GOALS:   Minimal at this point, as this is first session, but good learning today    IMPRESSIONS: Alessio Dallas is presenting today with R49.0 (Dysphonia) and R07.0 (Throat Discomfort) in the context of an imbalance in function of the intrinsic and extrinsic muscles of the larynx and longstanding anxiety.  Good work within today's session.  He reported good understanding of therapeutic rationales and was able to demonstrate all exercises with adequate accuracy.  He demonstrates limited awareness of pitch, and will benefit in the future from decreased task abstraction.    PLAN: I will see Mr. Dallas in approximately 3 weeks, at which point we will introduce resonant voice therapy or conversational training therapy based on which is most facilitating.     TOTAL SERVICE TIME: 60 minutes  TREATMENT (16939): 60 minutes  NO CHARGE FACILITY FEE (76549)    Alphonse Forde M.M., M.A., CCC-SLP  Speech-Language Pathologist  Certificate of Vocology  [phone]

## 2018-08-15 NOTE — MR AVS SNAPSHOT
After Visit Summary   8/15/2018    Alessio Dallas    MRN: [id number]           Patient Information     Date Of Birth          1970        Visit Information        Provider Department      8/15/2018 1:00 PM Wes Forde SLP M [facility]        Today's Diagnoses     Dysphonia    -  1    Throat discomfort           Follow-ups after your visit        Your next 10 appointments already scheduled     Sep 06, 2018 10:00 AM CDT   (Arrive by 9:45 AM)   RETURN SLP VOICE with YUNIER Ivory [facility] ([facility])    [address] [place] [address] [phone]              Who to contact     Please call your clinic at [phone] to:    Ask questions about your health    Make or cancel appointments    Discuss your medicines    Learn about your test results    Speak to your doctor            Additional Information About Your Visit        MyChart Information     [employer] gives you secure access to your electronic health record. If you see a primary care provider, you can also send messages to your care team and make appointments. If you have questions, please call your primary care clinic.  If you do not have a primary care provider, please call [phone] and they will assist you.      [employer] is an electronic gateway that provides easy, online access to your medical records. With [employer], you can request a clinic appointment, read your test results, renew a prescription or communicate with your care team.     To access your existing account, please contact your [facility] or call [phone] for assistance.        Care EveryWhere ID     This is your Care EveryWhere ID. This could be used by other organizations to access your [facility] medical records  [id number]         Blood Pressure from Last 3 Encounters:   03/29/18 112/74   02/17/17 113/77   10/20/15 107/72    Weight from Last 3 Encounters:   07/30/18  71.7 kg (158 lb)   03/29/18 71.7 kg (158 lb)   02/17/17 71.3 kg (157 lb 1.3 oz)              We Performed the Following     SPEECH/HEARING THERAPY, INDIVIDUAL        Primary Care Provider Office Phone # Fax #    Papo Carter [phone] [address] [place] [id number]        Equal Access to Services     MARK LENZ : Hadii aad ku hadasho Soomaali, waaxda luqadaha, qaybta kaalmada adeegyada, waxay idiin hayaan adeeg khtobinsh laBethanieángeln . So [facility] [phone].    ATENCIÓN: Si chris mireles, tiene a jauregui disposición servicios gratuitos de asistencia lingüística. [facility] [phone].    We comply with applicable federal civil rights laws and [place] laws. We do not discriminate on the basis of race, color, national origin, age, disability, sex, sexual orientation, or gender identity.            Thank you!     Thank you for choosing  [facility]  for your care. Our goal is always to provide you with excellent care. Hearing back from our patients is one way we can continue to improve our services. Please take a few minutes to complete the written survey that you may receive in the mail after your visit with us. Thank you!             Your Updated Medication List - Protect others around you: Learn how to safely use, store and throw away your medicines at www.disposemymeds.org.          This list is accurate as of 8/15/18  2:06 PM.  Always use your most recent med list.                   Brand Name Dispense Instructions for use Diagnosis    ketotifen 0.025 % Soln ophthalmic solution    ZADITOR/REFRESH ANTI-ITCH     1 drop 2 times daily        sertraline 50 MG tablet    ZOLOFT    90 tablet    Take 1 tablet (50 mg) by mouth daily    CLAU (generalized anxiety disorder)       traZODone 100 MG tablet    DESYREL    45 tablet    Take 0.5 tablets (50 mg) by mouth nightly as needed for sleep Note change in pill strength (backorder)    Insomnia, unspecified type

## 2018-08-15 NOTE — PROGRESS NOTES
"[facility] VOICE CLINIC  THERAPY NOTE (CPT 13075)    Patient: Alessio Dallas  Date of Service: 8/15/2018  Referring physician: Dr. Mcintosh  Impressions from most recent evaluation:  \"Alessio Dallas is presenting today with R49.0 (Dysphonia) and R07.0 (Throat Discomfort) in the context of an imbalance in function of the intrinsic and extrinsic muscles of the larynx and longstanding anxiety. Laryngeal exam demonstrates subtle concavity of the vibratory margins of the vocal folds bilaterally which in conjunction with marked supraglottic hyperfunction seen during phonatory tasks are indicative of a pattern of nonoptimal muscular recruitment as opposed to physiologic deficit.  Laryngeal function studies showed good phonatory respiratory coordination, but significantly increased inferred subglottal pressure.  This is exacerbated by extended breath groups during running speech noted both during laryngeal function studies and perceptual evaluation.\"    SUBJECTIVE:  Since the patient's last session, they report the following:     Overall symptoms are about the same    Clarification of goals:    Improved voice quality (reduced \"breaking\" of his voice)    Reduced throat discomfort    Not having to worry about his throat    OBJECTIVE:  PATIENT REPORTED MEASURES:  Patient Supplied Answers To SLP QOL Questionnaire  Therapy Quality of Life 8/15/2018   Since my l ast session, I used the speech therapy exercises and strategies as recommended by my speech pathologist. Not applicable   I feel that using my therapy techniques has become a habit Not applicable   I feel confident in my ability to manage my current and future symptoms. Neither agree nor disagree   Since my last session I feel my symptoms have --------. Stayed the same   Overall, since starting therapy I feel my symptoms are --------. About the same     Patient Specific Goal Metrics:  Dysponia SLP Goals 8/15/2018   How would you rate your speaking voice quality, if 0 is worst " "voice quality, and 10 is best voice? 5   How how severe is your [Throat Discomfort - or use patient specific description], if 0 is no [discomfort] at all and 10 is the worst [discomfort]? 5   How much does your voice problem bother you? Quite a bit     THERAPEUTIC ACTIVITIES    Instructed concepts and techniques for optimal vocal hygiene including:    Systemic hydration, including strategies for increasing daily water intake    Topical hydration - Gargling, saline nasal irrigation, humidification, steam, guaifenesin    Environmental barriers to healthy voicing - noise, inhaled irritants, room acoustics    Awareness and reduction of phonotraumatic behaviors    Moderating voice use    Substituting non-voice alternative behaviors    Avoiding cough and throat clearing    Exercises to promote reduced perilaryngeal muscle tension    Four way neck stretch instructed    Modifications for additional extension instructed    Base of tongue stretch instructed    Proper form for each stretch was emphasized, including:    Maintenance of posture for 10 breaths (~20-30 seconds)    Awareness of tension on inhalation with volitional relaxation into the stretch on exhalation    Avoidance of \"forcing\" a posture, only progressing far enough to feel the stretch    Manual Laryngeal massage was performed in combination with gentle forward resonant sounds    Significant tenderness of the thyrohyoid space with corresponding reduction of space     Thyrohyoid space, and base of tongue were targeted with gentle circular massage    Gentle lateralization of the larynx, and sternocleidomastoid massage was also performed.    Patient was trained to focus on intentional relaxation of jaw and tongue in addition to area of massage during these maneuvers.    Comfortably quiet forward resonant /m/ on descending glides was utilized throughout massage    Self-massage was instructed and patient was able to demonstrate this with acceptable " accuracy  Semi-Occluded Vocal Tract (SOVT) exercises instructed to reduce laryngeal tension, promote vocal fold pliability, and coordinate respiration and phonation    Straw with water resistance was found to be most facilitating     Sustained phonation, and voice vs. voiceless productions used to promote easy voicing and raise awareness of laryngeal tension    Ascending and descending glides utilized to promote vocal fold pliability    Patient demonstrated limited pitch accuracy both in sustained phonation and a descending glides    Instructed to use these exercises as a warm-up / cooldown, and to re-calibrate the voice throughout the day.    Good accuracy with moderate clinician support      A regimen for home practice was instructed.    I provided handouts of today's therapeutic activities to facilitate practice.    ASSESSMENT/PLAN  PROGRESS TOWARD LONG TERM GOALS:   Minimal at this point, as this is first session, but good learning today    IMPRESSIONS: Alessio Dallas is presenting today with R49.0 (Dysphonia) and R07.0 (Throat Discomfort) in the context of an imbalance in function of the intrinsic and extrinsic muscles of the larynx and longstanding anxiety.  Good work within today's session.  He reported good understanding of therapeutic rationales and was able to demonstrate all exercises with adequate accuracy.  He demonstrates limited awareness of pitch, and will benefit in the future from decreased task abstraction.    PLAN: I will see Mr. Dallas in approximately 3 weeks, at which point we will introduce resonant voice therapy or conversational training therapy based on which is most facilitating.     TOTAL SERVICE TIME: 60 minutes  TREATMENT (55284): 60 minutes  NO CHARGE FACILITY FEE (31915)    Alphonse Forde M.M., M.A., CCC-SLP  Speech-Language Pathologist  Certificate of Vocology  [phone]

## 2018-09-06 ENCOUNTER — OFFICE VISIT (OUTPATIENT)
Dept: OTOLARYNGOLOGY | Facility: CLINIC | Age: 48
End: 2018-09-06
Payer: COMMERCIAL

## 2018-09-06 DIAGNOSIS — R49.0 DYSPHONIA: Primary | ICD-10-CM

## 2018-09-06 DIAGNOSIS — R07.0 THROAT DISCOMFORT: ICD-10-CM

## 2018-09-06 NOTE — PROGRESS NOTES
"[facility] VOICE CLINIC  THERAPY NOTE (CPT 04011)    Patient: Alessio Dallas  Date of Service: 9/6/2018  Referring physician: Dr. Mcintosh  Impressions from most recent evaluation:  \"Alessio Dallas is presenting today with R49.0 (Dysphonia) and R07.0 (Throat Discomfort) in the context of an imbalance in function of the intrinsic and extrinsic muscles of the larynx and longstanding anxiety. Laryngeal exam demonstrates subtle concavity of the vibratory margins of the vocal folds bilaterally which in conjunction with marked supraglottic hyperfunction seen during phonatory tasks are indicative of a pattern of nonoptimal muscular recruitment as opposed to physiologic deficit.  Laryngeal function studies showed good phonatory respiratory coordination, but significantly increased inferred subglottal pressure.  This is exacerbated by extended breath groups during running speech noted both during laryngeal function studies and perceptual evaluation.\"    SUBJECTIVE:  Since the patient's last session, they report the following:     Overall symptoms are a good deal better    Successes: noticing a connection between anxiety and nervousness and neck tension and that stretching and massage help improve that awareness and allow resolution, voice quality improves with reduction of this tension    Hurdles:  No specific hurdles    OBJECTIVE:  PATIENT REPORTED MEASURES:  Patient Supplied Answers To SLP QOL Questionnaire  Therapy Quality of Life 9/6/2018   Since my l ast session, I used the speech therapy exercises and strategies as recommended by my speech pathologist. Agree   I feel that using my therapy techniques has become a habit Neither agree nor disagree   I feel confident in my ability to manage my current and future symptoms. Neither agree nor disagree   Since my last session I feel my symptoms have --------. Improved   Overall, since starting therapy I feel my symptoms are --------. Better   Overall, how much better? A good deal " "better     Patient Specific Goal Metrics:  Dysponia SLP Goals 8/15/2018 9/6/2018   How would you rate your speaking voice quality, if 0 is worst voice quality, and 10 is best voice? 5 7   How how severe is your [Throat Discomfort - or use patient specific description], if 0 is no [discomfort] at all and 10 is the worst [discomfort]? 5 3   How much does your voice problem bother you? Quite a bit Somewhat     THERAPEUTIC ACTIVITIES    Resonant Voice Therapy (RVT) exercises to promote forward locus of resonance and optimized pattern of laryngeal adduction    Easy descending glide on /m/ utilized in conjunction with relaxed jaw, tongue, and lightly closed lips to facilitate forward resonant sound    Use of the carrier phrase \"mmhmm\" instructed to promote generalization to everyday speech    Syllable level using /m/ in alternation with cardinal vowels on sustained pitches and speech inflection    Word level exercises featuring nasal continuant loaded stimuli    Phrase level exercises featuring nasal continuants in more complex phonemic contexts were employed    True chant speech was facilitating    He demonstrated good accuracy with moderate to maximal clincian support    Negative practice was employed and was facilitative      A regimen for home practice was instructed.    I provided an audio recording and handouts of today's therapeutic activities to facilitate practice.    ASSESSMENT/PLAN  PROGRESS TOWARD LONG TERM GOALS:   Adequate progress; please see above    IMPRESSIONS: Alsesio Dallas is presenting today with R49.0 (Dysphonia) and R07.0 (Throat Discomfort) in the context of an imbalance in function of the intrinsic and extrinsic muscles of the larynx and longstanding anxiety.  He demonstrated improved awareness of accuracy within today's session, though his ability to consistently access those targets required consistent support.  The use of RVT in a chant speech context was most facilitating ultimately, and this " was able to be generalized to phrase length with support.    PLAN: I will see Mr. Dallas in approximately 3 weeks, at which point we will continue to advance RVT through conversational context.     TOTAL SERVICE TIME: 60 minutes  TREATMENT (48879): 60 minutes  NO CHARGE FACILITY FEE (16735)    Alphonse Forde M.M., M.A., CCC-SLP  Speech-Language Pathologist  Certificate of Vocology  [phone]

## 2018-09-06 NOTE — LETTER
"9/6/2018      RE: Alessio Dallas  [address] [address]       [facility] VOICE CLINIC  THERAPY NOTE (CPT 20466)    Patient: Alessio Dallas  Date of Service: 9/6/2018  Referring physician: Dr. Mcintosh  Impressions from most recent evaluation:  \"Alessio Dallas is presenting today with R49.0 (Dysphonia) and R07.0 (Throat Discomfort) in the context of an imbalance in function of the intrinsic and extrinsic muscles of the larynx and longstanding anxiety. Laryngeal exam demonstrates subtle concavity of the vibratory margins of the vocal folds bilaterally which in conjunction with marked supraglottic hyperfunction seen during phonatory tasks are indicative of a pattern of nonoptimal muscular recruitment as opposed to physiologic deficit.  Laryngeal function studies showed good phonatory respiratory coordination, but significantly increased inferred subglottal pressure.  This is exacerbated by extended breath groups during running speech noted both during laryngeal function studies and perceptual evaluation.\"    SUBJECTIVE:  Since the patient's last session, they report the following:     Overall symptoms are a good deal better    Successes: noticing a connection between anxiety and nervousness and neck tension and that stretching and massage help improve that awareness and allow resolution, voice quality improves with reduction of this tension    Hurdles:  No specific hurdles    OBJECTIVE:  PATIENT REPORTED MEASURES:  Patient Supplied Answers To SLP QOL Questionnaire  Therapy Quality of Life 9/6/2018   Since my l ast session, I used the speech therapy exercises and strategies as recommended by my speech pathologist. Agree   I feel that using my therapy techniques has become a habit Neither agree nor disagree   I feel confident in my ability to manage my current and future symptoms. Neither agree nor disagree   Since my last session I feel my symptoms have --------. Improved   Overall, since starting therapy " "I feel my symptoms are --------. Better   Overall, how much better? A good deal better     Patient Specific Goal Metrics:  Dysponia SLP Goals 8/15/2018 9/6/2018   How would you rate your speaking voice quality, if 0 is worst voice quality, and 10 is best voice? 5 7   How how severe is your [Throat Discomfort - or use patient specific description], if 0 is no [discomfort] at all and 10 is the worst [discomfort]? 5 3   How much does your voice problem bother you? Quite a bit Somewhat     THERAPEUTIC ACTIVITIES    Resonant Voice Therapy (RVT) exercises to promote forward locus of resonance and optimized pattern of laryngeal adduction    Easy descending glide on /m/ utilized in conjunction with relaxed jaw, tongue, and lightly closed lips to facilitate forward resonant sound    Use of the carrier phrase \"mmhmm\" instructed to promote generalization to everyday speech    Syllable level using /m/ in alternation with cardinal vowels on sustained pitches and speech inflection    Word level exercises featuring nasal continuant loaded stimuli    Phrase level exercises featuring nasal continuants in more complex phonemic contexts were employed    True chant speech was facilitating    He demonstrated good accuracy with moderate to maximal clincian support    Negative practice was employed and was facilitative      A regimen for home practice was instructed.    I provided an audio recording and handouts of today's therapeutic activities to facilitate practice.    ASSESSMENT/PLAN  PROGRESS TOWARD LONG TERM GOALS:   Adequate progress; please see above    IMPRESSIONS: Alessio Dallas is presenting today with R49.0 (Dysphonia) and R07.0 (Throat Discomfort) in the context of an imbalance in function of the intrinsic and extrinsic muscles of the larynx and longstanding anxiety.  He demonstrated improved awareness of accuracy within today's session, though his ability to consistently access those targets required consistent support.  The " use of RVT in a chant speech context was most facilitating ultimately, and this was able to be generalized to phrase length with support.    PLAN: I will see Mr. Dallas in approximately 3 weeks, at which point we will continue to advance RVT through conversational context.     TOTAL SERVICE TIME: 60 minutes  TREATMENT (07113): 60 minutes  NO CHARGE FACILITY FEE (06566)    Alphonse Forde M.M., M.A., CCC-SLP  Speech-Language Pathologist  Certificate of Vocology  [phone]      Wes Forde, SLP

## 2018-09-06 NOTE — MR AVS SNAPSHOT
After Visit Summary   9/6/2018    Alessio Dallas    MRN: [id number]           Patient Information     Date Of Birth          1970        Visit Information        Provider Department      9/6/2018 10:00 AM Wes Forde SLP [facility]        Today's Diagnoses     Dysphonia    -  1    Throat discomfort           Follow-ups after your visit        Your next 10 appointments already scheduled     Sep 27, 2018  1:00 PM CDT   (Arrive by 12:45 PM)   RETURN SLP VOICE with [facility] ([facility])    [address] [place] [id number] [phone]              Who to contact     Please call your clinic at [phone] to:    Ask questions about your health    Make or cancel appointments    Discuss your medicines    Learn about your test results    Speak to your doctor            Additional Information About Your Visit        MyChart Information     ClaimIt gives you secure access to your electronic health record. If you see a primary care provider, you can also send messages to your care team and make appointments. If you have questions, please call your primary care clinic.  If you do not have a primary care provider, please call [phone] and they will assist you.      ClaimIt is an electronic gateway that provides easy, online access to your medical records. With ClaimIt, you can request a clinic appointment, read your test results, renew a prescription or communicate with your care team.     To access your existing account, please contact your [facility] or call [phone] for assistance.        Care EveryWhere ID     This is your Care EveryWhere ID. This could be used by other organizations to access your [facility] medical records  [id number]         Blood Pressure from Last 3 Encounters:   03/29/18 112/74   02/17/17 113/77   10/20/15 107/72    Weight from Last 3 Encounters:   07/30/18  71.7 kg (158 lb)   03/29/18 71.7 kg (158 lb)   02/17/17 71.3 kg (157 lb 1.3 oz)              We Performed the Following     SPEECH/HEARING THERAPY, INDIVIDUAL        Primary Care Provider Office Phone # Fax #    Papo Carter [phone] [address] [place] [id number]        Equal Access to Services     MARK LENZ : Hadii aad ku hadasho Soomaali, waaxda luqadaha, qaybta kaalmada adeegyada, waxay idiin hayaan adeeg khtobinsh laBethanienágeln . So [facility] [phone].    ATENCIÓN: Si chris mireles, tiene a jauregui disposición servicios gratuitos de asistencia lingüística. [facility] [phone].    We comply with applicable federal civil rights laws and [place] laws. We do not discriminate on the basis of race, color, national origin, age, disability, sex, sexual orientation, or gender identity.            Thank you!     Thank you for choosing  [facility]  for your care. Our goal is always to provide you with excellent care. Hearing back from our patients is one way we can continue to improve our services. Please take a few minutes to complete the written survey that you may receive in the mail after your visit with us. Thank you!             Your Updated Medication List - Protect others around you: Learn how to safely use, store and throw away your medicines at www.disposemymeds.org.          This list is accurate as of 9/6/18 11:35 AM.  Always use your most recent med list.                   Brand Name Dispense Instructions for use Diagnosis    ketotifen 0.025 % Soln ophthalmic solution    ZADITOR/REFRESH ANTI-ITCH     1 drop 2 times daily        sertraline 50 MG tablet    ZOLOFT    90 tablet    Take 1 tablet (50 mg) by mouth daily    CLAU (generalized anxiety disorder)       traZODone 100 MG tablet    DESYREL    45 tablet    Take 0.5 tablets (50 mg) by mouth nightly as needed for sleep Note change in pill strength (backorder)    Insomnia, unspecified type

## 2018-10-25 ENCOUNTER — OFFICE VISIT (OUTPATIENT)
Dept: OTOLARYNGOLOGY | Facility: CLINIC | Age: 48
End: 2018-10-25
Payer: COMMERCIAL

## 2018-10-25 DIAGNOSIS — R49.0 DYSPHONIA: Primary | ICD-10-CM

## 2018-10-25 DIAGNOSIS — R07.0 THROAT DISCOMFORT: ICD-10-CM

## 2018-10-25 NOTE — PROGRESS NOTES
"[facility] VOICE CLINIC  THERAPY NOTE (CPT 82652)    Patient: Alessio Dallas  Date of Service: 10/25/2018  Referring physician: Dr. Mcintosh  Impressions from most recent evaluation:  \"Alessio Dallas is presenting today with R49.0 (Dysphonia) and R07.0 (Throat Discomfort) in the context of an imbalance in function of the intrinsic and extrinsic muscles of the larynx and longstanding anxiety. Laryngeal exam demonstrates subtle concavity of the vibratory margins of the vocal folds bilaterally which in conjunction with marked supraglottic hyperfunction seen during phonatory tasks are indicative of a pattern of nonoptimal muscular recruitment as opposed to physiologic deficit.  Laryngeal function studies showed good phonatory respiratory coordination, but significantly increased inferred subglottal pressure.  This is exacerbated by extended breath groups during running speech noted both during laryngeal function studies and perceptual evaluation.\"    SUBJECTIVE:  Since the patient's last session, they report the following:     Overall symptoms are a good deal better    Successes: reduced strain, more aware of when his throat is getting tight    Hurdles:  Interceding when tightness occurs with mixed results,    OBJECTIVE:  PATIENT REPORTED MEASURES:  Patient Supplied Answers To SLP QOL Questionnaire  Therapy Quality of Life 10/25/2018   Since my l ast session, I used the speech therapy exercises and strategies as recommended by my speech pathologist. Agree   I feel that using my therapy techniques has become a habit Neither agree nor disagree   I feel confident in my ability to manage my current and future symptoms. Neither agree nor disagree   Since my last session I feel my symptoms have --------. Improved   Overall, since starting therapy I feel my symptoms are --------. Better   Overall, how much better? A good deal better     Patient Specific Goal Metrics:  Dysponia SLP Goals 8/15/2018 9/6/2018 10/25/2018   How would you " "rate your speaking voice quality, if 0 is worst voice quality, and 10 is best voice? 5 7 7   How how severe is your [Throat Discomfort - or use patient specific description], if 0 is no [discomfort] at all and 10 is the worst [discomfort]? 5 3 4   How much does your voice problem bother you? Quite a bit Somewhat A little bit       THERAPEUTIC ACTIVITIES    Semi-Occluded Vocal Tract (SOVT) exercises instructed to reduce laryngeal tension, promote vocal fold pliability, and coordinate respiration and phonation    Patient had fallen out of the use of semi-occluded vocal tract exercises the following was reinstructed:    Sustained phonation, and voice vs. voiceless productions used to promote easy voicing and raise awareness of laryngeal tension    Ascending and descending glides utilized to promote vocal fold pliability    Overall decreased roughness and strain is appreciated with the use of semi-occluded sounds    Building on this we transitions to the use of flow phonation stimuli    /ju/ glide words    Good accuracy with minimal to moderate clinician support    Negative practice was incorporated to promote awareness of target versus habitual patterns of voicing    Patient demonstrates modest awareness of \"how\" to access different voices but his awareness of accuracy has improved significantly over time    Resonant Voice Therapy (RVT) exercises to promote forward locus of resonance and optimized pattern of laryngeal adduction    Previously assigned exercises were demonstrated    Good accuracy at the word level following clinician model    Moderate accuracy with independent trials    The use of tactile biofeedback was facilitating and improved accuracy    This was advanced to phrases of cumulative length    Focus on \"follow-through\" was significantly facilitating    Moderate clinician support required      A regimen for home practice was instructed.    I provided handouts of today's therapeutic activities to " facilitate practice.    ASSESSMENT/PLAN  PROGRESS TOWARD LONG TERM GOALS:   Adequate progress; please see above    IMPRESSIONS: Alessio Dallas is presenting today with R49.0 (Dysphonia) and R07.0 (Throat Discomfort) in the context of an imbalance in function of the intrinsic and extrinsic muscles of the larynx and longstanding anxiety.  He continues to report improving awareness and decreased frequency of strain with voicing.  That said awareness of how to access target versus habitual voice continues to be challenging per his report and this is born out in today's session.  Negative practice was incorporated extensively throughout today's session appearing target versus habitual voice qualities, and is going to be wise in building awareness over time.    PLAN: Mr. Dallas would like to practice techniques independently for a time utilizing negative practice to continue to bolster awareness of target voice quality.  He will be seen on a as needed basis.     TOTAL SERVICE TIME: 60 minutes  TREATMENT (10549): 60 minutes  NO CHARGE FACILITY FEE (77559)    Alphonse Forde M.M., M.A., CCC-SLP  Speech-Language Pathologist  Certificate of Vocology  [phone]

## 2018-10-25 NOTE — MR AVS SNAPSHOT
After Visit Summary   10/25/2018    Alessio Dallas    MRN: [id number]           Patient Information     Date Of Birth          1970        Visit Information        Provider Department      10/25/2018 10:00 AM Wes Forde SLP [facility]        Today's Diagnoses     Dysphonia    -  1    Throat discomfort           Follow-ups after your visit        Who to contact     Please call your clinic at [phone] to:    Ask questions about your health    Make or cancel appointments    Discuss your medicines    Learn about your test results    Speak to your doctor            Additional Information About Your Visit        MyChart Information     [employer] gives you secure access to your electronic health record. If you see a primary care provider, you can also send messages to your care team and make appointments. If you have questions, please call your primary care clinic.  If you do not have a primary care provider, please call [phone] and they will assist you.      [employer] is an electronic gateway that provides easy, online access to your medical records. With [employer], you can request a clinic appointment, read your test results, renew a prescription or communicate with your care team.     To access your existing account, please contact your [facility] or call [phone] for assistance.        Care EveryWhere ID     This is your Care EveryWhere ID. This could be used by other organizations to access your [facility] medical records  [id number]         Blood Pressure from Last 3 Encounters:   03/29/18 112/74   02/17/17 113/77   10/20/15 107/72    Weight from Last 3 Encounters:   07/30/18 71.7 kg (158 lb)   03/29/18 71.7 kg (158 lb)   02/17/17 71.3 kg (157 lb 1.3 oz)              We Performed the Following     SPEECH/HEARING THERAPY, INDIVIDUAL        Primary Care Provider Office Phone # Fax #    Papo Carter [phone] [address] [place] [address]  MANJEET LOPEZ  [facility] [address]        Equal Access to Services     MARK LENZ : Hadii aad ku hadcliffgabino Manzano, wajosianeda ludebraadaha, qamerylta nikitamaviry arana. So [facility] [phone].    ATENCIÓN: Si habla español, tiene a jauregui disposición servicios gratuitos de asistencia lingüística. Teresoame al [phone].    We comply with applicable federal civil rights laws and [place] laws. We do not discriminate on the basis of race, color, national origin, age, disability, sex, sexual orientation, or gender identity.            Thank you!     Thank you for choosing [facility]  for your care. Our goal is always to provide you with excellent care. Hearing back from our patients is one way we can continue to improve our services. Please take a few minutes to complete the written survey that you may receive in the mail after your visit with us. Thank you!             Your Updated Medication List - Protect others around you: Learn how to safely use, store and throw away your medicines at www.disposemymeds.org.          This list is accurate as of 10/25/18 11:42 AM.  Always use your most recent med list.                   Brand Name Dispense Instructions for use Diagnosis    ketotifen 0.025 % Soln ophthalmic solution    ZADITOR/REFRESH ANTI-ITCH     1 drop 2 times daily        sertraline 50 MG tablet    ZOLOFT    90 tablet    Take 1 tablet (50 mg) by mouth daily    CLAU (generalized anxiety disorder)       traZODone 100 MG tablet    DESYREL    45 tablet    Take 0.5 tablets (50 mg) by mouth nightly as needed for sleep Note change in pill strength (backorder)    Insomnia, unspecified type

## 2018-10-25 NOTE — LETTER
"10/25/2018      RE: Alessio Dallas  [address] [facility] [address]       [facility]  THERAPY NOTE (CPT 11243)    Patient: Alessio Dallas  Date of Service: 10/25/2018  Referring physician: Dr. Mcintosh  Impressions from most recent evaluation:  \"Alessio Dallas is presenting today with R49.0 (Dysphonia) and R07.0 (Throat Discomfort) in the context of an imbalance in function of the intrinsic and extrinsic muscles of the larynx and longstanding anxiety. Laryngeal exam demonstrates subtle concavity of the vibratory margins of the vocal folds bilaterally which in conjunction with marked supraglottic hyperfunction seen during phonatory tasks are indicative of a pattern of nonoptimal muscular recruitment as opposed to physiologic deficit.  Laryngeal function studies showed good phonatory respiratory coordination, but significantly increased inferred subglottal pressure.  This is exacerbated by extended breath groups during running speech noted both during laryngeal function studies and perceptual evaluation.\"    SUBJECTIVE:  Since the patient's last session, they report the following:     Overall symptoms are a good deal better    Successes: reduced strain, more aware of when his throat is getting tight    Hurdles:  Interceding when tightness occurs with mixed results,    OBJECTIVE:  PATIENT REPORTED MEASURES:  Patient Supplied Answers To SLP QOL Questionnaire  Therapy Quality of Life 10/25/2018   Since my l ast session, I used the speech therapy exercises and strategies as recommended by my speech pathologist. Agree   I feel that using my therapy techniques has become a habit Neither agree nor disagree   I feel confident in my ability to manage my current and future symptoms. Neither agree nor disagree   Since my last session I feel my symptoms have --------. Improved   Overall, since starting therapy I feel my symptoms are --------. Better   Overall, how much better? A good deal better     Patient " "Specific Goal Metrics:  Dysponia SLP Goals 8/15/2018 9/6/2018 10/25/2018   How would you rate your speaking voice quality, if 0 is worst voice quality, and 10 is best voice? 5 7 7   How how severe is your [Throat Discomfort - or use patient specific description], if 0 is no [discomfort] at all and 10 is the worst [discomfort]? 5 3 4   How much does your voice problem bother you? Quite a bit Somewhat A little bit       THERAPEUTIC ACTIVITIES    Semi-Occluded Vocal Tract (SOVT) exercises instructed to reduce laryngeal tension, promote vocal fold pliability, and coordinate respiration and phonation    Patient had fallen out of the use of semi-occluded vocal tract exercises the following was reinstructed:    Sustained phonation, and voice vs. voiceless productions used to promote easy voicing and raise awareness of laryngeal tension    Ascending and descending glides utilized to promote vocal fold pliability    Overall decreased roughness and strain is appreciated with the use of semi-occluded sounds    Building on this we transitions to the use of flow phonation stimuli    /ju/ glide words    Good accuracy with minimal to moderate clinician support    Negative practice was incorporated to promote awareness of target versus habitual patterns of voicing    Patient demonstrates modest awareness of \"how\" to access different voices but his awareness of accuracy has improved significantly over time    Resonant Voice Therapy (RVT) exercises to promote forward locus of resonance and optimized pattern of laryngeal adduction    Previously assigned exercises were demonstrated    Good accuracy at the word level following clinician model    Moderate accuracy with independent trials    The use of tactile biofeedback was facilitating and improved accuracy    This was advanced to phrases of cumulative length    Focus on \"follow-through\" was significantly facilitating    Moderate clinician support required      A regimen for home " practice was instructed.    I provided handouts of today's therapeutic activities to facilitate practice.    ASSESSMENT/PLAN  PROGRESS TOWARD LONG TERM GOALS:   Adequate progress; please see above    IMPRESSIONS: Alessio Dallas is presenting today with R49.0 (Dysphonia) and R07.0 (Throat Discomfort) in the context of an imbalance in function of the intrinsic and extrinsic muscles of the larynx and longstanding anxiety.  He continues to report improving awareness and decreased frequency of strain with voicing.  That said awareness of how to access target versus habitual voice continues to be challenging per his report and this is born out in today's session.  Negative practice was incorporated extensively throughout today's session appearing target versus habitual voice qualities, and is going to be wise in building awareness over time.    PLAN: Mr. Dallas would like to practice techniques independently for a time utilizing negative practice to continue to bolster awareness of target voice quality.  He will be seen on a as needed basis.     TOTAL SERVICE TIME: 60 minutes  TREATMENT (45320): 60 minutes  NO CHARGE FACILITY FEE (70947)    Alphonse Forde M.M., M.A., CCC-SLP  Speech-Language Pathologist  Certificate of Vocology  [phone]

## 2019-01-09 ENCOUNTER — OFFICE VISIT (OUTPATIENT)
Dept: OPHTHALMOLOGY | Facility: CLINIC | Age: 49
End: 2019-01-09
Payer: COMMERCIAL

## 2019-01-09 DIAGNOSIS — H52.13 MYOPIA, BILATERAL: Primary | ICD-10-CM

## 2019-01-09 DIAGNOSIS — H52.4 PRESBYOPIA OF BOTH EYES: ICD-10-CM

## 2019-01-09 DIAGNOSIS — H10.13 ALLERGIC CONJUNCTIVITIS, BILATERAL: ICD-10-CM

## 2019-01-09 DIAGNOSIS — H01.003 BLEPHARITIS OF BOTH EYES, UNSPECIFIED EYELID, UNSPECIFIED TYPE: ICD-10-CM

## 2019-01-09 DIAGNOSIS — H01.006 BLEPHARITIS OF BOTH EYES, UNSPECIFIED EYELID, UNSPECIFIED TYPE: ICD-10-CM

## 2019-01-09 ASSESSMENT — TONOMETRY
OD_IOP_MMHG: 17
IOP_METHOD: TONOPEN
OS_IOP_MMHG: 13

## 2019-01-09 ASSESSMENT — REFRACTION_WEARINGRX
OD_SPHERE: -4.25
OD_CYLINDER: +0.25
OD_AXIS: 070
OS_SPHERE: -4.25
OS_AXIS: 065
OS_CYLINDER: +0.75

## 2019-01-09 ASSESSMENT — REFRACTION_MANIFEST
OD_SPHERE: -4.50
OD_AXIS: 045
OS_CYLINDER: +0.75
OS_SPHERE: -3.75
OS_ADD: +1.50
OS_AXIS: 055
OD_CYLINDER: +0.25
OD_ADD: +1.50

## 2019-01-09 ASSESSMENT — CONF VISUAL FIELD
OS_NORMAL: 1
OD_NORMAL: 1
METHOD: COUNTING FINGERS

## 2019-01-09 ASSESSMENT — VISUAL ACUITY
CORRECTION_TYPE: GLASSES
METHOD: SNELLEN - LINEAR
OD_CC: 20/15-
OS_CC: 20/15-2

## 2019-01-09 ASSESSMENT — CUP TO DISC RATIO
OS_RATIO: 0.3
OD_RATIO: 0.3

## 2019-01-09 ASSESSMENT — EXTERNAL EXAM - RIGHT EYE: OD_EXAM: NORMAL

## 2019-01-09 ASSESSMENT — EXTERNAL EXAM - LEFT EYE: OS_EXAM: NORMAL

## 2019-01-09 ASSESSMENT — SLIT LAMP EXAM - LIDS
COMMENTS: TR MGD
COMMENTS: TR MGD

## 2019-01-09 NOTE — PROGRESS NOTES
HPI  Alessio Dallas is a 48 year old male here for comprehensive eye exam.  Vision slightly blurry for near, didn't get bifocal, takes glasses off to read.  Itching mild now, takes zatidor most days.      PMH:  none  POH:  Glasses for myopia, allergic conjunctivitis, no surgery, no trauma  Oc Meds:  zatidor twice a day   FH:  Denies any glaucoma, age related macular degeneration, or other known eye diseases       Assessment & Plan      (H52.13) Myopia, bilateral - Both Eyes  (primary encounter diagnosis)  (H52.4) Presbyopia of both eyes - Both Eyes  Comment: may continue taking off single vision lenses to read   Plan: manifest refraction done and prescription for glasses given progressive add lenses or single vision lenses options    (H10.13) Allergic conjunctivitis, bilateral - Both Eyes  Comment: mild, controlled with zatidor currently  Plan: continue same    (H01.003,  H01.006) Blepharitis of both eyes, unspecified eyelid, unspecified type - Both Eyes  Comment: minimal  Plan: try warm moist compresses and lid hygiene to see if any contribution to allergies     -----------------------------------------------------------------------------------    Patient disposition:   Return in about 1 year (around 1/9/2020). Call for sooner appointment as needed.    Complete documentation of historical and exam elements from today's encounter can be found in the full encounter summary report (not reduplicated in this progress note). I personally obtained the chief complaint(s) and history of present illness.  I have confirmed and edited as necessary the CC, HPI, PMH/PSH, social history, FMH, ROS, and exam/neuro findings as obtained by the technician or others. I have examined this patient myself and I personally viewed the image(s) and studies listed above and the documentation reflects my findings and interpretation.  I formulated and edited as necessary the assessment and plan and discussed the findings and management plan  with the patient and family.     Nhi Moyer MD

## 2019-01-23 DIAGNOSIS — F41.1 GAD (GENERALIZED ANXIETY DISORDER): ICD-10-CM

## 2019-01-23 NOTE — TELEPHONE ENCOUNTER
Last time prescribed: 8/10/18 , 90 tabs/caps x 1 refills  Last office visit: 3/29/18  Next appointment: No Future Appointment Scheduled    Medication is being filled for 1 time refill only due to:  Patient needs to be seen because it has been more than one year since last visit. due for visit in March  Kerry Santillan RN  [facility]

## 2019-02-22 ENCOUNTER — HOSPITAL ENCOUNTER (OUTPATIENT)
Dept: CT IMAGING | Facility: CLINIC | Age: 49
Discharge: HOME | End: 2019-02-22
Attending: FAMILY MEDICINE | Admitting: FAMILY MEDICINE
Payer: COMMERCIAL

## 2019-02-22 DIAGNOSIS — E78.00 HIGH CHOLESTEROL: ICD-10-CM

## 2019-02-22 PROCEDURE — 75571 CT HRT W/O DYE W/CA TEST: CPT | Mod: GA

## 2019-02-22 PROCEDURE — 75571 CT HRT W/O DYE W/CA TEST: CPT | Mod: 26 | Performed by: INTERNAL MEDICINE

## 2019-02-25 ENCOUNTER — TELEPHONE (OUTPATIENT)
Dept: CARDIOLOGY | Facility: CLINIC | Age: 49
End: 2019-02-25

## 2019-02-25 NOTE — PROGRESS NOTES
CT calcium coronary screening results report sent to patient via regular mail and routed to PCP to follow up with patient.

## 2019-02-25 NOTE — TELEPHONE ENCOUNTER
Called Pt to review his test results from the CT calcium scan done on 2/22/2019, discussed calcium score 0, recommended his PCP follow up on pulmonary nodules reported by Radiology.    Rigo Wray MD  PGY-6 Cardiology  Pager: [phone]  February 25, 2019

## 2019-05-02 ENCOUNTER — OFFICE VISIT (OUTPATIENT)
Dept: FAMILY MEDICINE | Facility: CLINIC | Age: 49
End: 2019-05-02
Payer: COMMERCIAL

## 2019-05-02 VITALS
HEART RATE: 59 BPM | RESPIRATION RATE: 16 BRPM | TEMPERATURE: 97.4 F | WEIGHT: 164.75 LBS | OXYGEN SATURATION: 99 % | HEIGHT: 73 IN | SYSTOLIC BLOOD PRESSURE: 121 MMHG | DIASTOLIC BLOOD PRESSURE: 75 MMHG | BODY MASS INDEX: 21.83 KG/M2

## 2019-05-02 DIAGNOSIS — M77.11 RIGHT LATERAL EPICONDYLITIS: ICD-10-CM

## 2019-05-02 DIAGNOSIS — Z13.220 SCREENING CHOLESTEROL LEVEL: ICD-10-CM

## 2019-05-02 DIAGNOSIS — Z13.1 SCREENING FOR DIABETES MELLITUS: ICD-10-CM

## 2019-05-02 DIAGNOSIS — F41.1 GAD (GENERALIZED ANXIETY DISORDER): ICD-10-CM

## 2019-05-02 DIAGNOSIS — Z00.00 PREVENTATIVE HEALTH CARE: Primary | ICD-10-CM

## 2019-05-02 DIAGNOSIS — G47.00 INSOMNIA, UNSPECIFIED TYPE: ICD-10-CM

## 2019-05-02 DIAGNOSIS — Z23 NEEDS FLU SHOT: ICD-10-CM

## 2019-05-02 LAB
CHOLEST SERPL-MCNC: 253 MG/DL (ref 0–200)
CHOLEST/HDLC SERPL: 3.7 {RATIO} (ref 0–5)
FASTING SPECIMEN: YES
GLUCOSE P FAST SERPL-MCNC: 100 MG/DL (ref 51–109)
HDLC SERPL-MCNC: 68 MG/DL
LDLC SERPL CALC-MCNC: 174 MG/DL (ref 0–129)
TRIGL SERPL-MCNC: 57 MG/DL (ref 0–150)
VLDL-CHOLESTEROL: 11 (ref 7–32)

## 2019-05-02 RX ORDER — TRAZODONE HYDROCHLORIDE 100 MG/1
50 TABLET ORAL
Qty: 45 TABLET | Refills: 4 | Status: SHIPPED | OUTPATIENT
Start: 2019-05-02 | End: 2020-05-18

## 2019-05-02 ASSESSMENT — ANXIETY QUESTIONNAIRES
5. BEING SO RESTLESS THAT IT IS HARD TO SIT STILL: NOT AT ALL
1. FEELING NERVOUS, ANXIOUS, OR ON EDGE: SEVERAL DAYS
3. WORRYING TOO MUCH ABOUT DIFFERENT THINGS: NOT AT ALL
2. NOT BEING ABLE TO STOP OR CONTROL WORRYING: NOT AT ALL
6. BECOMING EASILY ANNOYED OR IRRITABLE: SEVERAL DAYS
GAD7 TOTAL SCORE: 3
7. FEELING AFRAID AS IF SOMETHING AWFUL MIGHT HAPPEN: NOT AT ALL
IF YOU CHECKED OFF ANY PROBLEMS ON THIS QUESTIONNAIRE, HOW DIFFICULT HAVE THESE PROBLEMS MADE IT FOR YOU TO DO YOUR WORK, TAKE CARE OF THINGS AT HOME, OR GET ALONG WITH OTHER PEOPLE: NOT DIFFICULT AT ALL

## 2019-05-02 ASSESSMENT — PATIENT HEALTH QUESTIONNAIRE - PHQ9
SUM OF ALL RESPONSES TO PHQ QUESTIONS 1-9: 1
5. POOR APPETITE OR OVEREATING: SEVERAL DAYS

## 2019-05-02 ASSESSMENT — MIFFLIN-ST. JEOR: SCORE: 1676.05

## 2019-05-02 NOTE — NURSING NOTE
"48 year old  Chief Complaint   Patient presents with     Physical     Elbow Pain     right side       Blood pressure 121/75, pulse 59, temperature 97.4  F (36.3  C), temperature source Oral, resp. rate 16, height 1.846 m (6' 0.68\"), weight 74.7 kg (164 lb 12 oz), SpO2 99 %. Body mass index is 21.93 kg/m .  Patient Active Problem List   Diagnosis     Preventative health care     Generalized anxiety disorder     Insomnia     Myopia     Irritable bowel syndrome       Wt Readings from Last 2 Encounters:   05/02/19 74.7 kg (164 lb 12 oz)   07/30/18 71.7 kg (158 lb)     BP Readings from Last 3 Encounters:   05/02/19 121/75   03/29/18 112/74   02/17/17 113/77         Current Outpatient Medications   Medication     sertraline (ZOLOFT) 50 MG tablet     traZODone (DESYREL) 100 MG tablet     ketotifen (ZADITOR/REFRESH ANTI-ITCH) 0.025 % SOLN ophthalmic solution     No current facility-administered medications for this visit.        Social History     Tobacco Use     Smoking status: Never Smoker     Smokeless tobacco: Never Used   Substance Use Topics     Alcohol use: Yes     Comment: Sometime     Drug use: No       Health Maintenance Due   Topic Date Due     HIV SCREEN (SYSTEM ASSIGNED)  06/04/1990     CLAU QUESTIONNAIRE 3 MONTHS  06/29/2018     PHQ-9 Q3 MONTHS  06/29/2018     PREVENTIVE CARE VISIT  03/29/2019       No results found for: PAP      May 2, 2019 8:43 AM    "

## 2019-05-02 NOTE — PROGRESS NOTES
CHIEF COMPLAINT: Annual Physical      HISTORY OF PRESENT ILLNESS: Alessio Dallas is a 48 year old male who presents for an annual physical. Overall, he is doing well.    He has been experiencing pain in his right elbow for several weeks. He believes this may be related to his sleeping position, with his right arm bent above his head. This interferes with some of his daily activities as well.      He had a CT coronary calcium screening on 12/28/17, and again on 2/22/19. His initial total calcium score was 6.2, and his most recent score was 0. He reports that he was concerned this could be related to his keto diet, so he made some dietary changes. He started eating more fish, and a fermented soybean associated with decreasing coronary calcium. He last lipid panel was on 10/20/15, at which time his LDL cholesterol was 103, his HDL cholesterol was 55, and his triglycerides were 43. After making his dietary changes, he had an independent lipid panel about 1 year ago, and he reports that his LDL and HDL had increased.    He takes sertraline 50mg daily for his anxiety, and this is working well for him. He would like to continue with the medication. He also takes trazodone 50mg as needed for sleep.    Finally, he is requesting a skin survey today, as he has several skin lesions on his back.    FAMILY, SOCIAL AND PAST SURGICAL HISTORIES:  Unchanged.       MEDICATIONS:  Carefully reviewed.       HEALTHCARE MAINTENANCE:    Health Maintenance   Topic Date Due     HIV SCREEN (SYSTEM ASSIGNED)  06/04/1990     CLAU QUESTIONNAIRE 3 MONTHS  06/29/2018     PHQ-9 Q3 MONTHS  06/29/2018     PREVENTIVE CARE VISIT  03/29/2019     INFLUENZA VACCINE (Season Ended) 09/01/2019     LIPID SCREEN Q5 YR MALE (SYSTEM ASSIGNED)  10/20/2020     ZOSTER IMMUNIZATION (1 of 2) 06/04/2022     DTAP/TDAP/TD IMMUNIZATION (3 - Td) 10/20/2025     IPV IMMUNIZATION  Aged Out     MENINGITIS IMMUNIZATION  Aged Out       Eye exam: Wears glasses. Eye care up to  "date.  Hearing: No concerns.  Dental: Dental care up to date.  BP: Great.  BMI: Perfect.  Immunizations: Immunizations up to date.  Colonoscopy: No family history, NA  PSA: No family history, NA        REVIEW OF SYSTEMS:  A 10-point review is negative.       OBJECTIVE:    GENERAL: Alessio Dallas is in no distress.  Affect is upbeat.     VITAL SIGNS: /75 (BP Location: Right arm, Patient Position: Sitting, Cuff Size: Adult Regular)   Pulse 59   Temp 97.4  F (36.3  C) (Oral)   Resp 16   Ht 1.846 m (6' 0.68\")   Wt 74.7 kg (164 lb 12 oz)   SpO2 99%   BMI 21.93 kg/m    HEENT:  Head is normocephalic, atraumatic. Ears clear bilaterally. No pain with palpation of the frontal or maxillary sinuses.  Eyes are grossly normal.  Nose is free of any congestion or discharge.  Teeth in good repair.  Mucous membranes are moist.  Throat looks benign.  Neck is supple without adenopathy or thyromegaly.  No carotid bruits are heard, no JVD.   BACK:  Smooth and straight.  No pain to percussion.  No CVA tenderness.   LUNGS:  Clear to auscultation bilaterally.   HEART:  Regular rate and rhythm without murmur.   ABDOMEN:  Benign.     EXTREMITIES:  Ankles free of any edema. Right lateral epicondyle tender. Medial epicondyle and olecranon nontender. Strength 5/5.  SKIN:  Warm and dry.       PHQ9 score = 1  CLAU 7 score = 3      LABORATORY DATA: Labs pending      ASSESSMENT AND PLAN:   1. Adult physical exam, up to date on healthcare maintenance strategies.   2. Right lateral epicondylitis: Band It given.  3. CLAU: Refilled sertraline 50mg  4. Insomnia: Refilled trazodone 100mg  5. Labs: Lipid panel and glucose, pending.    Call with any problems or questions.     I, Britt Rosales, am serving as a scribe to document services personally performed by Dr. Papo Carter, based on data collection and the provider's statements to me. Dr. Carter has reviewed, edited, and approved the above note.     --Papo Carter MD        "

## 2019-05-03 ASSESSMENT — ANXIETY QUESTIONNAIRES: GAD7 TOTAL SCORE: 3

## 2019-10-01 ENCOUNTER — HEALTH MAINTENANCE LETTER (OUTPATIENT)
Age: 49
End: 2019-10-01

## 2020-05-18 DIAGNOSIS — F41.1 GAD (GENERALIZED ANXIETY DISORDER): ICD-10-CM

## 2020-05-18 DIAGNOSIS — G47.00 INSOMNIA, UNSPECIFIED TYPE: ICD-10-CM

## 2020-05-18 RX ORDER — TRAZODONE HYDROCHLORIDE 100 MG/1
50 TABLET ORAL
Qty: 45 TABLET | Refills: 0 | Status: SHIPPED | OUTPATIENT
Start: 2020-05-18 | End: 2020-06-29

## 2020-05-18 NOTE — TELEPHONE ENCOUNTER
Sertraline: Last time prescribed: 5/2/19, 90 tabs x 4 refills  Trazodone: Last time prescribed: 5/2/19 , 45 tabs x 4 refills    Last office visit: 5/2/19  Next appointment: 6/29/2020    Medication is being filled for 1 time refill only due to:  Patient needs to be seen because it has been more than one year since last visit. pt has appt 6/29/20  Kerry Santillan RN  [facility]

## 2020-06-29 ENCOUNTER — OFFICE VISIT (OUTPATIENT)
Dept: FAMILY MEDICINE | Facility: CLINIC | Age: 50
End: 2020-06-29
Payer: COMMERCIAL

## 2020-06-29 VITALS
BODY MASS INDEX: 21.67 KG/M2 | OXYGEN SATURATION: 100 % | DIASTOLIC BLOOD PRESSURE: 76 MMHG | TEMPERATURE: 97.4 F | HEIGHT: 73 IN | SYSTOLIC BLOOD PRESSURE: 124 MMHG | RESPIRATION RATE: 15 BRPM | HEART RATE: 91 BPM | WEIGHT: 163.5 LBS

## 2020-06-29 DIAGNOSIS — F41.1 GAD (GENERALIZED ANXIETY DISORDER): ICD-10-CM

## 2020-06-29 DIAGNOSIS — Z13.220 SCREENING CHOLESTEROL LEVEL: ICD-10-CM

## 2020-06-29 DIAGNOSIS — Z00.00 PREVENTATIVE HEALTH CARE: Primary | ICD-10-CM

## 2020-06-29 DIAGNOSIS — Z13.1 SCREENING FOR DIABETES MELLITUS: ICD-10-CM

## 2020-06-29 DIAGNOSIS — G47.00 INSOMNIA, UNSPECIFIED TYPE: ICD-10-CM

## 2020-06-29 LAB
CHOLEST SERPL-MCNC: 335 MG/DL (ref 0–200)
CHOLEST/HDLC SERPL: 4.1 {RATIO} (ref 0–5)
FASTING SPECIMEN: NO
GLUCOSE CASUAL: 100 MG/DL (ref 51–200)
HDLC SERPL-MCNC: 81 MG/DL
LDLC SERPL CALC-MCNC: 242 MG/DL (ref 0–129)
TRIGL SERPL-MCNC: 60 MG/DL (ref 0–150)
VLDL-CHOLESTEROL: 12 (ref 7–32)

## 2020-06-29 RX ORDER — TRAZODONE HYDROCHLORIDE 100 MG/1
TABLET ORAL
Qty: 45 TABLET | Refills: 1 | Status: SHIPPED | OUTPATIENT
Start: 2020-06-29 | End: 2021-10-04

## 2020-06-29 SDOH — HEALTH STABILITY: MENTAL HEALTH: HOW OFTEN DO YOU HAVE A DRINK CONTAINING ALCOHOL?: 2-3 TIMES A WEEK

## 2020-06-29 SDOH — HEALTH STABILITY: MENTAL HEALTH: HOW OFTEN DO YOU HAVE 6 OR MORE DRINKS ON ONE OCCASION?: NEVER

## 2020-06-29 SDOH — HEALTH STABILITY: MENTAL HEALTH: HOW MANY STANDARD DRINKS CONTAINING ALCOHOL DO YOU HAVE ON A TYPICAL DAY?: 1 OR 2

## 2020-06-29 ASSESSMENT — MIFFLIN-ST. JEOR: SCORE: 1665.51

## 2020-06-29 NOTE — NURSING NOTE
"50 year old  Chief Complaint   Patient presents with     Physical     no other concerns       Blood pressure 124/76, pulse 91, temperature 97.4  F (36.3  C), temperature source Oral, resp. rate 15, height 1.854 m (6' 1\"), weight 74.2 kg (163 lb 8 oz), SpO2 100 %. Body mass index is 21.57 kg/m .  Patient Active Problem List   Diagnosis     Preventative health care     Generalized anxiety disorder     Insomnia     Myopia     Irritable bowel syndrome       Wt Readings from Last 2 Encounters:   06/29/20 74.2 kg (163 lb 8 oz)   05/02/19 74.7 kg (164 lb 12 oz)     BP Readings from Last 3 Encounters:   06/29/20 124/76   05/02/19 121/75   03/29/18 112/74         Current Outpatient Medications   Medication     ketotifen (ZADITOR/REFRESH ANTI-ITCH) 0.025 % SOLN ophthalmic solution     sertraline (ZOLOFT) 50 MG tablet     traZODone (DESYREL) 100 MG tablet     No current facility-administered medications for this visit.        Social History     Tobacco Use     Smoking status: Never Smoker     Smokeless tobacco: Never Used   Substance Use Topics     Alcohol use: Yes     Frequency: 2-3 times a week     Drinks per session: 1 or 2     Binge frequency: Never     Comment: Sometime     Drug use: No       Health Maintenance Due   Topic Date Due     HIV SCREENING  06/04/1987     CLAU ASSESSMENT  08/02/2019     PHQ-9  08/02/2019     PHQ-2  01/01/2020     PREVENTIVE CARE VISIT  05/02/2020       No results found for: PAP      June 29, 2020 8:52 AM    "

## 2020-07-12 NOTE — PROGRESS NOTES
CHIEF COMPLAINT:  Annual wellness visit.      HISTORY OF PRESENT ILLNESS:  Alessio is a 50-year-old here for the above.  Overall, he is doing quite well.  He typically works out of his home, so working during this time of the pandemic has not been a problem for him.  He has not had any symptoms of virus.      ACTIVE MEDICAL PROBLEMS:  Reviewed.  He feels that his anxiety is under fairly good control.  He continues to take sertraline 50 mg once daily for that and 100 mg of trazodone at bedtime to help with insomnia.  That combination works quite well.      ACTIVE MEDICAL PROBLEMS:  Reviewed.      CURRENT MEDICATIONS:  Reviewed.      FAMILY, SOCIAL AND PAST SURGICAL HISTORIES:  Unchanged.      HEALTHCARE MAINTENANCE:  He wears glasses and his eye care is up to date and there have been no significant changes.  His hearing is fine.  Dental care is up to date.  Blood pressure is perfect at 124/76.  BMI is 21.57.  Weight is within less than a pound of what it was about a year ago.  Immunizations are completely up to date.  From a laboratory standpoint, we are going to go ahead and do a lipid panel as well as a glucose level.  Of note, he does have hyperlipidemia, which he is aware of.  He is on keto diet and last time his LDL was 174.  He is not interested in being on a statin.  If his current LDL is that high or higher, perhaps it would be worth having him talk to a dietitian about other strategies that might help lower that in a nonpharmacologic way.  He will consider that.  At 50 with no first-degree relative with prostate or colon cancer, we are going to hold off on both of those screenings until he turns 52.  He will let me know in the meantime if anything else happens.      REVIEW OF SYSTEMS:  A 10-point review of systems is negative.      OBJECTIVE:  Alessio is in no distress.  Affect is upbeat.  He is wearing a mask.  Blood pressure is 124/76.  Pulse 91 and regular.  Temperature is 97.4.  He is 6 feet 1 inch and  weighs 163 pounds 8 ounces and has a BMI of 21.57.  O2 sats are 100% on room air.   HEENT:  Head is normocephalic, atraumatic.  Ears are free of any significant cerumen.  The TMs look fine.  There is no pain with palpation of the frontal or maxillary sinuses.  Eyes are grossly normal.     NECK:  Supple without adenopathy or thyromegaly.  No carotid bruits are heard, no JVD.   BACK:  Smooth and straight.  No pain to percussion.  No CVA tenderness.   LUNGS:  Clear to auscultation bilaterally.   HEART:  Regular rate and rhythm without murmur.   EXTREMITIES:  Ankles free of any edema.  Good peripheral pulses.      LABORATORY DATA:  Pending.      ASSESSMENT AND PLAN:   1.  Adult physical exam, up to date with healthcare maintenance strategies.  History of both generalized anxiety disorder and insomnia for which he takes sertraline 50 mg daily and trazodone 100 mg nightly.  Both medications will be refilled.   2.  Screening for hyperlipidemia in someone who has known elevated LDL levels historically likely due to keto diet.  Depending on the results, we may consider having our dietitian meet with him.  He has had at least 1 visit with a cardiologist before.   3.  Call with any problems or questions.

## 2020-12-28 DIAGNOSIS — G47.00 INSOMNIA, UNSPECIFIED TYPE: ICD-10-CM

## 2020-12-28 NOTE — TELEPHONE ENCOUNTER
Last time prescribed: 6/29/2020 , 45  tabs/caps x 1 refills  Last office visit: 6/29/2020  Next appointment: No future appointment     Prescription approved per G Refill Protocol.  Spoke to pt - offered trazadone 50 mg - he will take 1/2 tab daily.  Kerry Santillan RN  [facility]

## 2021-01-07 RX ORDER — TRAZODONE HYDROCHLORIDE 50 MG/1
TABLET, FILM COATED ORAL
Qty: 45 TABLET | Refills: 1 | Status: SHIPPED | OUTPATIENT
Start: 2021-01-07 | End: 2021-07-02

## 2021-01-07 RX ORDER — TRAZODONE HYDROCHLORIDE 100 MG/1
TABLET ORAL
Qty: 45 TABLET | Refills: 1 | Status: CANCELLED | OUTPATIENT
Start: 2021-01-07

## 2021-01-15 ENCOUNTER — HEALTH MAINTENANCE LETTER (OUTPATIENT)
Age: 51
End: 2021-01-15

## 2021-07-02 DIAGNOSIS — G47.00 INSOMNIA, UNSPECIFIED TYPE: ICD-10-CM

## 2021-07-02 RX ORDER — TRAZODONE HYDROCHLORIDE 50 MG/1
TABLET, FILM COATED ORAL
Qty: 45 TABLET | Refills: 0 | Status: SHIPPED | OUTPATIENT
Start: 2021-07-02 | End: 2021-10-04

## 2021-07-02 NOTE — TELEPHONE ENCOUNTER
Last time prescribed: 1/7/2021 , 45 tabs x 1 refills  Last office visit: 6/29/2020  Next appointment: 10/04/2021    Medication is being filled for 1 time refill only due to:  Patient needs to be seen because it has been more than one year since last visit.  Has future visit     Adrienne Davis RN  July 2, 2021 12:04 PM

## 2021-08-09 DIAGNOSIS — F41.1 GAD (GENERALIZED ANXIETY DISORDER): ICD-10-CM

## 2021-08-10 NOTE — TELEPHONE ENCOUNTER
Last Office Visit: 6/29/20  [facility] [place] [place] Appointments: 10/4/21  Medication last refilled: 6/29/20 #90 with 4 refill(s)    Routing refill request to provider for review/approval because:  Drug not on the G refill protocol not seen within 12 months.    Lore Hernandez, RN, BSN

## 2021-09-04 ENCOUNTER — HEALTH MAINTENANCE LETTER (OUTPATIENT)
Age: 51
End: 2021-09-04

## 2021-10-04 ENCOUNTER — OFFICE VISIT (OUTPATIENT)
Dept: FAMILY MEDICINE | Facility: CLINIC | Age: 51
End: 2021-10-04
Payer: COMMERCIAL

## 2021-10-04 VITALS
TEMPERATURE: 96.8 F | OXYGEN SATURATION: 100 % | DIASTOLIC BLOOD PRESSURE: 80 MMHG | WEIGHT: 169.5 LBS | HEART RATE: 94 BPM | BODY MASS INDEX: 22.46 KG/M2 | HEIGHT: 73 IN | RESPIRATION RATE: 15 BRPM | SYSTOLIC BLOOD PRESSURE: 129 MMHG

## 2021-10-04 DIAGNOSIS — Z23 NEED FOR VACCINATION: ICD-10-CM

## 2021-10-04 DIAGNOSIS — L73.9 FOLLICULITIS: ICD-10-CM

## 2021-10-04 DIAGNOSIS — Z78.9 KETOGENIC DIET: ICD-10-CM

## 2021-10-04 DIAGNOSIS — F41.1 GAD (GENERALIZED ANXIETY DISORDER): ICD-10-CM

## 2021-10-04 DIAGNOSIS — Z00.00 WELLNESS EXAMINATION: Primary | ICD-10-CM

## 2021-10-04 DIAGNOSIS — G47.00 INSOMNIA, UNSPECIFIED TYPE: ICD-10-CM

## 2021-10-04 DIAGNOSIS — Z13.6 ENCOUNTER FOR SCREENING FOR CORONARY ARTERY DISEASE: ICD-10-CM

## 2021-10-04 DIAGNOSIS — E78.5 HYPERLIPIDEMIA LDL GOAL <130: ICD-10-CM

## 2021-10-04 DIAGNOSIS — Z13.220 SCREENING FOR LIPID DISORDERS: ICD-10-CM

## 2021-10-04 LAB
ANION GAP SERPL CALCULATED.3IONS-SCNC: 2 MMOL/L (ref 3–14)
BUN SERPL-MCNC: 20 MG/DL (ref 7–30)
CALCIUM SERPL-MCNC: 9.6 MG/DL (ref 8.5–10.1)
CHLORIDE BLD-SCNC: 106 MMOL/L (ref 94–109)
CHOLEST SERPL-MCNC: 280 MG/DL
CO2 SERPL-SCNC: 31 MMOL/L (ref 20–32)
CREAT SERPL-MCNC: 0.84 MG/DL (ref 0.66–1.25)
FASTING STATUS PATIENT QL REPORTED: NO
GFR SERPL CREATININE-BSD FRML MDRD: >90 ML/MIN/1.73M2
GLUCOSE BLD-MCNC: 89 MG/DL (ref 70–99)
HDLC SERPL-MCNC: 60 MG/DL
LDLC SERPL CALC-MCNC: 209 MG/DL
NONHDLC SERPL-MCNC: 220 MG/DL
POTASSIUM BLD-SCNC: 4.2 MMOL/L (ref 3.4–5.3)
SODIUM SERPL-SCNC: 139 MMOL/L (ref 133–144)
TRIGL SERPL-MCNC: 53 MG/DL

## 2021-10-04 PROCEDURE — 82465 ASSAY BLD/SERUM CHOLESTEROL: CPT | Performed by: FAMILY MEDICINE

## 2021-10-04 PROCEDURE — 80048 BASIC METABOLIC PNL TOTAL CA: CPT | Performed by: FAMILY MEDICINE

## 2021-10-04 RX ORDER — MUPIROCIN 20 MG/G
OINTMENT TOPICAL 2 TIMES DAILY
Qty: 30 G | Refills: 1 | Status: SHIPPED | OUTPATIENT
Start: 2021-10-04 | End: 2021-11-19

## 2021-10-04 RX ORDER — TRAZODONE HYDROCHLORIDE 50 MG/1
TABLET, FILM COATED ORAL
Qty: 45 TABLET | Refills: 4 | Status: SHIPPED | OUTPATIENT
Start: 2021-10-04 | End: 2022-02-09

## 2021-10-04 ASSESSMENT — ANXIETY QUESTIONNAIRES
6. BECOMING EASILY ANNOYED OR IRRITABLE: SEVERAL DAYS
GAD7 TOTAL SCORE: 7
7. FEELING AFRAID AS IF SOMETHING AWFUL MIGHT HAPPEN: NOT AT ALL
3. WORRYING TOO MUCH ABOUT DIFFERENT THINGS: MORE THAN HALF THE DAYS
2. NOT BEING ABLE TO STOP OR CONTROL WORRYING: NOT AT ALL
5. BEING SO RESTLESS THAT IT IS HARD TO SIT STILL: NOT AT ALL
1. FEELING NERVOUS, ANXIOUS, OR ON EDGE: MORE THAN HALF THE DAYS

## 2021-10-04 ASSESSMENT — MIFFLIN-ST. JEOR: SCORE: 1690.11

## 2021-10-04 ASSESSMENT — PATIENT HEALTH QUESTIONNAIRE - PHQ9
5. POOR APPETITE OR OVEREATING: MORE THAN HALF THE DAYS
SUM OF ALL RESPONSES TO PHQ QUESTIONS 1-9: 4

## 2021-10-04 NOTE — PROGRESS NOTES
CHIEF COMPLAINT: Annual Wellness Exam      HISTORY OF PRESENT ILLNESS:  Alessio Dallas is a 51 year old male who presents for an annual wellness visit.  Overall, he is doing well. He wears glasses and eye care is up to date. No cataracts, glaucoma, or macular degeneration. No hearing changes or concerns. His dental care is up to date. His BP is 129/80 today. Body mass index is 22.27 kg/m . From an immunization standpoint, he is due for an influenza vaccine. He has typically gotten an influenza vaccine every other year and is unsure at this time if he would like to get one this year. He will defer an influenza booster at this time. Alessio has received both doses of the Pfizer COVID-19 vaccine, administered on 4/8/21 and 4/29/21. He is interested in getting a COVID-19 booster when it is available to him. He does not often go to restaurants or busy indoor spaces other than the grocery store. I encouraged him to continue wearing a mask in the grocery store to avoid both influenza and breakthrough COVID.      Alessio does not have a family history of colon cancer or prostate cancer, and we have previously agreed on waiting to begin these screenings until he turns 52. We discussed starting colon cancer screenings with Marcia at his next annual wellness visit, and Alessio agrees with this.     Alessio has a history of hyperlipidemia and has been on a Keto diet for about 5 years. Today, he reports that his cuts are healing faster, his joint pain has lessened, his gums are no longer receding, and he has not gotten a cavity since he started on this diet. He would previously get a cavity about once every six months. However, Alessio is concerned about his cholesterol and LDL levels. We have previously discussed meeting with a dietician about reducing these non-pharmacologically, as he is not interested in starting a statin medication. Alessio is interested in a CT coronary calcium scan. He has a family history of a hole in the heart,  "which has been found in two of his paternal uncles (brothers).     Alessio is requesting a skin survey today. He does not have any specific concerns, but reports that he does get some pimples on his thighs and buttocks. These resolve on their own but he wishes that they wouldn't occur in the first place. He denies any crusting in his nostrils.     Alessio has anxiety that has typically been under fairly good control. He continues to take sertraline 50 mg once daily for that and trazodone 25 mg (0.5 of a 50 mg pill) at bedtime to help with insomnia. That combination works quite well for him. He would like to continue taking sertraline and does not currently need refills of this, but would like refills in the future. Alessio does need a refill of trazodone, sent in for 50 mg tablets that he will break in half. PHQ-9 = 4, CLAU-7 = 7.      FAMILY, SOCIAL AND PAST SURGICAL HISTORIES: Updated      MEDICATIONS:   Carefully Reviewed      REVIEW OF SYSTEMS:  10-point review of systems negative unless otherwise stated in the HPI.       OBJECTIVE:    EXAM:    GENERAL: Alessio is in no distress.  Affect is upbeat.   Alert and oriented x3  /80 (BP Location: Right arm, Patient Position: Sitting, Cuff Size: Adult Regular)   Pulse 94   Temp 96.8  F (36  C) (Skin)   Resp 15   Ht 1.858 m (6' 1.15\")   Wt 76.9 kg (169 lb 8 oz)   SpO2 100%   BMI 22.27 kg/m    HEENT:  Head is normocephalic, atraumatic. Ears clear bilaterally. No pain with palpation of the frontal or maxillary sinuses.  Eyes are grossly normal.  Nose and mouth masked.  Neck is supple without adenopathy or thyromegaly.  No carotid bruits are heard, no JVD.   BACK:  Smooth and straight.  No pain to percussion.  No CVA tenderness.   LUNGS:  Clear to auscultation bilaterally.   HEART:  Regular rate and rhythm without murmur.   EXTREMITIES:  Ankles free of any edema.   SKIN:  Warm and dry. Folliculitis, buttocks      LABORATORY DATA:   No results found for any visits on " 10/04/21.        ASSESSMENT AND PLAN:   1. Annual Wellness Exam: Up to date with most healthcare maintenance strategies. He would like to defer an influenza booster at this time. BMP, lipid panel completed today, results pending. Alessio is not fasting.   2. Generalized anxiety disorder: Alessio is doing well on sertraline 50 mg daily and would like to continue with this. He does not currently need refills but will contact me when he does. At that time, I will refill his sertraline for 90 tablets with four additional refills.   3. Insomnia: Alessio takes 0.5 of a 50 mg tablet of trazodone nightly to help with insomnia. He is requesting refills today, which I have sent in.  4. Ketogenic diet: Alessio has been on a keto diet for 5 years, which has been largely helpful for him.  5. Hyperlipidemia LDL goal <130: Alessio has a history of elevated cholesterol and LDL levels. He is requesting a follow-up CT coronary calcium scan, ordered today.   6. Encounter for screening for coronary artery disease: Alessio is requesting a CT coronary calcium scan due to his history of hyperlipidemia. Ordered today.   7. Folliculitis.  Rx'd Bactroban 2% ointment, apply BID.  Also, small amount to nares at HS for 14 days in an effort to break to possible cycle.  8. Follow up in 1 year or call if there are any questions or concerns.      I, Lucina Frederick, am serving as a scribe to document services personally performed by Dr. Papo Carter, based on data collection and the provider's statements to me. Dr. Carter has reviewed, edited, and approved the above note.       I, Dr. Papo Carter, have interviewed and examined this patient, and I have thoroughly reviewed and edited this note and agree with its contents. SHELLEY

## 2021-10-04 NOTE — NURSING NOTE
"51 year old  Chief Complaint   Patient presents with     Physical     no other concerns        Blood pressure 129/80, pulse 94, temperature 96.8  F (36  C), temperature source Skin, resp. rate 15, height 1.858 m (6' 1.15\"), weight 76.9 kg (169 lb 8 oz), SpO2 100 %. Body mass index is 22.27 kg/m .  Patient Active Problem List   Diagnosis     Preventative health care     Generalized anxiety disorder     Insomnia     Myopia     Irritable bowel syndrome       Wt Readings from Last 2 Encounters:   10/04/21 76.9 kg (169 lb 8 oz)   06/29/20 74.2 kg (163 lb 8 oz)     BP Readings from Last 3 Encounters:   10/04/21 129/80   06/29/20 124/76   05/02/19 121/75         Current Outpatient Medications   Medication     ketotifen (ZADITOR/REFRESH ANTI-ITCH) 0.025 % SOLN ophthalmic solution     sertraline (ZOLOFT) 50 MG tablet     traZODone (DESYREL) 100 MG tablet     traZODone (DESYREL) 50 MG tablet     No current facility-administered medications for this visit.       Social History     Tobacco Use     Smoking status: Never Smoker     Smokeless tobacco: Never Used   Substance Use Topics     Alcohol use: Yes     Comment: 1 x per week     Drug use: No       Health Maintenance Due   Topic Date Due     ADVANCE CARE PLANNING  Never done     HIV SCREENING  Never done     HEPATITIS C SCREENING  Never done     CLAU ASSESSMENT  08/02/2019     PHQ-9  08/02/2019     PHQ-2  01/01/2021     PREVENTIVE CARE VISIT  06/29/2021     INFLUENZA VACCINE (1) 09/01/2021       No results found for: PAP      October 4, 2021 8:35 AM    "

## 2021-10-05 ASSESSMENT — ANXIETY QUESTIONNAIRES: GAD7 TOTAL SCORE: 7

## 2021-10-21 ENCOUNTER — HOSPITAL ENCOUNTER (OUTPATIENT)
Dept: CARDIOLOGY | Facility: CLINIC | Age: 51
End: 2021-10-21
Attending: FAMILY MEDICINE

## 2021-10-21 DIAGNOSIS — E78.5 HYPERLIPIDEMIA LDL GOAL <130: ICD-10-CM

## 2021-10-21 DIAGNOSIS — Z13.6 ENCOUNTER FOR SCREENING FOR CORONARY ARTERY DISEASE: ICD-10-CM

## 2021-10-21 PROCEDURE — 75571 CT HRT W/O DYE W/CA TEST: CPT

## 2021-10-21 PROCEDURE — 75571 CT HRT W/O DYE W/CA TEST: CPT | Mod: 26 | Performed by: INTERNAL MEDICINE

## 2021-11-19 DIAGNOSIS — L73.9 FOLLICULITIS: ICD-10-CM

## 2021-11-22 RX ORDER — MUPIROCIN 20 MG/G
OINTMENT TOPICAL 2 TIMES DAILY
Qty: 15 G | Refills: 0 | Status: SHIPPED | OUTPATIENT
Start: 2021-11-22

## 2021-11-22 NOTE — TELEPHONE ENCOUNTER
Medication requested: mupirocin (BACTROBAN) 2 % external ointment  Last office visit: 10/4/21  [facility] appointments: none  Medication last refilled: 10/4/21   Last qualifying labs: n/a    Routing refill request to provider for review/approval because:  Drug not on the [facility] refill protocol   Prescribed for folliculitis, use 14 days. Will have him follow up if symptoms aren't resolving.     Michelle Garcia MS RN-BC  11/22/21  11:39 AM

## 2022-02-09 ENCOUNTER — TELEPHONE (OUTPATIENT)
Dept: FAMILY MEDICINE | Facility: CLINIC | Age: 52
End: 2022-02-09
Payer: COMMERCIAL

## 2022-02-09 DIAGNOSIS — G47.00 INSOMNIA, UNSPECIFIED TYPE: ICD-10-CM

## 2022-02-09 DIAGNOSIS — F41.1 GAD (GENERALIZED ANXIETY DISORDER): ICD-10-CM

## 2022-02-09 RX ORDER — TRAZODONE HYDROCHLORIDE 50 MG/1
TABLET, FILM COATED ORAL
Qty: 45 TABLET | Refills: 2 | Status: SHIPPED | OUTPATIENT
Start: 2022-02-09 | End: 2022-11-15

## 2022-02-09 NOTE — TELEPHONE ENCOUNTER
Who is calling? Patient  Medication name: sertraline (ZOLOFT) 50 MG tablet  Is this a refill request? Yes  Have they contacted the pharmacy?  Yes  Pharmacy:   [facility], [facility] [place] [id number] [address] (Pharmacy)    Question/Concern: NA  Would patient like a call back? No

## 2022-02-09 NOTE — TELEPHONE ENCOUNTER
Patient asking for transfer of medications to new mail order pharmacy. Also will be out of sertraline in 5 days and not sure his new order from mail order will be mailed to him in time. Will send over #10 to local pharmacy of sertraline.     Michelle Garcia MS RN-BC  02/09/22  12:40 PM

## 2022-09-29 DIAGNOSIS — F41.1 GAD (GENERALIZED ANXIETY DISORDER): ICD-10-CM

## 2022-09-29 NOTE — CONFIDENTIAL NOTE
Sertraline (Zoloft) 50 mg    Last Office Visit: 10/4/21  Future [facility] Appointments: None  Medication last refilled: 2/9/22 #90 with 2 refill(s)    Last PHQ-9 4/3/2018 5/2/2019 10/4/2021   PHQ-9 Total Score 6 1 4     CLAU-7 SCORE 4/3/2018 5/2/2019 10/4/2021   Total Score 11 3 7     Medication is being filled for 1 time refill only due to:  Patient needs to be seen because due for an appointment.. Needs updated PHQ-9 and CLAU-7.    [employer] message sent to patient to call and schedule appointment.    Lore Hernandez, BSN, RN, CCM

## 2022-10-22 ENCOUNTER — HEALTH MAINTENANCE LETTER (OUTPATIENT)
Age: 52
End: 2022-10-22

## 2022-11-14 DIAGNOSIS — G47.00 INSOMNIA, UNSPECIFIED TYPE: ICD-10-CM

## 2022-11-15 RX ORDER — TRAZODONE HYDROCHLORIDE 50 MG/1
TABLET, FILM COATED ORAL
Qty: 45 TABLET | Refills: 0 | Status: SHIPPED | OUTPATIENT
Start: 2022-11-15 | End: 2023-07-12

## 2022-11-15 NOTE — TELEPHONE ENCOUNTER
Last visit 10/4/21, no future visit  Medication is being filled for 1 time refill only due to:  Patient needs to be seen because it has been more than one year since last visit.    staff to call pt for appt.  Kerry Santillan RN  [facility]

## 2022-12-04 ENCOUNTER — HEALTH MAINTENANCE LETTER (OUTPATIENT)
Age: 52
End: 2022-12-04

## 2023-01-08 NOTE — ADDENDUM NOTE
Addended by: MEDHAT TOABR on: 1/7/2021 09:36 AM     Modules accepted: Orders    
Addended by: NAMRATA ALBRECHT on: 1/7/2021 09:44 AM     Modules accepted: Orders    
No intervention

## 2023-05-25 ENCOUNTER — APPOINTMENT (OUTPATIENT)
Dept: GENERAL RADIOLOGY | Facility: CLINIC | Age: 53
End: 2023-05-25
Attending: EMERGENCY MEDICINE
Payer: COMMERCIAL

## 2023-05-25 ENCOUNTER — HOSPITAL ENCOUNTER (EMERGENCY)
Facility: CLINIC | Age: 53
Discharge: HOME | End: 2023-05-25
Attending: PHYSICIAN ASSISTANT | Admitting: PHYSICIAN ASSISTANT
Payer: COMMERCIAL

## 2023-05-25 VITALS
SYSTOLIC BLOOD PRESSURE: 127 MMHG | OXYGEN SATURATION: 99 % | RESPIRATION RATE: 18 BRPM | HEIGHT: 73 IN | HEART RATE: 85 BPM | WEIGHT: 175 LBS | BODY MASS INDEX: 23.19 KG/M2 | DIASTOLIC BLOOD PRESSURE: 79 MMHG | TEMPERATURE: 98.1 F

## 2023-05-25 DIAGNOSIS — S92.354A CLOSED NONDISPLACED FRACTURE OF FIFTH METATARSAL BONE OF RIGHT FOOT, INITIAL ENCOUNTER: ICD-10-CM

## 2023-05-25 PROCEDURE — 28470 CLTX METATARSAL FX WO MNP EA: CPT | Mod: T9

## 2023-05-25 PROCEDURE — 99284 EMERGENCY DEPT VISIT MOD MDM: CPT | Mod: 25

## 2023-05-25 PROCEDURE — 73630 X-RAY EXAM OF FOOT: CPT | Mod: RT

## 2023-05-25 RX ORDER — OXYCODONE HYDROCHLORIDE 5 MG/1
5 TABLET ORAL EVERY 6 HOURS PRN
Qty: 12 TABLET | Refills: 0 | Status: SHIPPED | OUTPATIENT
Start: 2023-05-25 | End: 2023-05-28

## 2023-05-26 NOTE — ED PROVIDER NOTES
ED ATTENDING PHYSICIAN NOTE:   I evaluated this patient in conjunction with Modesto Alexis PA-C  I have participated in the care of the patient and personally performed key elements of the history, exam, and medical decision making.      HPI:   Alessio Dallas is a 52 year old male who arrives due to right foot pain after he twisted his right foot while climbing stairs when his she was started to come off and then his foot twisted.  Complaint of sudden right foot pain which is worse with walking.  No other injuries.    Independent Historian:   None - Patient Only    Review of External Notes: None     EXAM:   Nursing note and vitals reviewed.  Constitutional:  Appears well-developed and well-nourished.   HENT:   Head:    Atraumatic.   Eyes:    Pupils are equal, round, and reactive to light.   Neck:    Normal range of motion. Neck supple.      No tracheal deviation present. No thyromegaly present.  Musculoskeletal:  Right foot exam: Tenderness with swelling over the proximal aspect of the fifth metatarsal of the right foot.  Good dorsalis pedis pulse.  Sensation intact distally.  No breaks to the skin.  Neurological:   Alert and oriented to person, place, and time.   Skin:    Skin is warm and dry. No rash noted. No pallor.       Independent Interpretation (X-rays, CTs, rhythm strip):  I reviewed the patient's right foot x-rays and find him to have a fracture of the proximal fifth metatarsal which is not significantly displaced.    Social Determinants of Health affecting care:   None     MEDICAL DECISION MAKING/ASSESSMENT AND PLAN:    I found this patient have a right foot fracture of the proximal fifth metatarsal without significant displacement.  This is a closed fracture and he is neurovascular intact.  The plan is that he will be placed in a fracture boot and crutches and told to follow-up with orthopedic surgery.     DIAGNOSIS:     ICD-10-CM    1. Closed nondisplaced fracture of fifth metatarsal bone of right foot,  initial encounter  S92.354A            DISPOSITION:   home     Raquel Monsalve MD     5/25/2023  [facility] EMERGENCY DEPT       Raquel Monsalve MD  05/25/23 2036

## 2023-05-26 NOTE — ED TRIAGE NOTES
Pt was going up stairs and rolled his R ankle with pain on lateral edge of R foot and ankle when weight bearing     Triage Assessment     Row Name 05/25/23 1943       Triage Assessment (Adult)    Airway WDL WDL       Respiratory WDL    Respiratory WDL WDL       Skin Circulation/Temperature WDL    Skin Circulation/Temperature WDL WDL       Cardiac WDL    Cardiac WDL WDL       Peripheral/Neurovascular WDL    Peripheral Neurovascular WDL WDL       Cognitive/Neuro/Behavioral WDL    Cognitive/Neuro/Behavioral WDL WDL

## 2023-05-26 NOTE — ED PROVIDER NOTES
History     Chief Complaint:  Foot Pain and Trauma     The history is provided by the patient.      Alessio Dallas is a 52 year old male  who presents to the ED alone for evaluation of foot pain. Patient reports he was running up the stairs wearing slippers and slipped, twisting his right foot a few hours before arrival to ED. He states his ankle is okay. Patient denies loss of sensation or weakness in the right foot.    Independent Historian:   None - Patient Only    Review of External Notes:     Medications:    oxyCODONE (ROXICODONE) 5 MG tablet  ketotifen (ZADITOR/REFRESH ANTI-ITCH) 0.025 % SOLN ophthalmic solution  mupirocin (BACTROBAN) 2 % external ointment  sertraline (ZOLOFT) 50 MG tablet  traZODone (DESYREL) 50 MG tablet        Past Medical History:    Past Medical History:   Diagnosis Date     Anxiety      Seasonal allergies        Past Surgical History:    History reviewed. No pertinent surgical history.     Physical Exam     No data found.     Physical Exam  Vitals and nursing note reviewed.   Constitutional:       Appearance: He is not diaphoretic.   Eyes:      Conjunctiva/sclera: Conjunctivae normal.   Cardiovascular:      Pulses:           Dorsalis pedis pulses are 2+ on the right side.        Posterior tibial pulses are 2+ on the right side.   Pulmonary:      Effort: Pulmonary effort is normal.   Musculoskeletal:      Right knee: Normal. No swelling, deformity or bony tenderness. Normal range of motion. No tenderness.      Right lower leg: Normal. No swelling. No edema.      Right ankle: Normal. No swelling, deformity or ecchymosis. No tenderness. Normal range of motion. Normal pulse.      Right Achilles Tendon: No tenderness.      Right foot: Normal capillary refill. Bony tenderness (Fifth metatarsal.) present. No swelling or deformity. Normal pulse.        Feet:    Skin:     General: Skin is warm.      Capillary Refill: Capillary refill takes less than 2 seconds.      Coloration: Skin is not  pale.      Findings: No bruising or erythema.   Neurological:      Sensory: Sensation is intact. No sensory deficit.      Motor: Motor function is intact. No weakness.   Psychiatric:         Mood and Affect: Mood normal.         Behavior: Behavior normal.         Thought Content: Thought content normal.         Emergency Department Course     Imaging:  Foot  XR, G/E 3 views, right   Final Result   IMPRESSION: Nondisplaced fracture of the base of the fifth metatarsal. Degenerative change first MTP joint with mild hallux valgus.         Report per radiology    Laboratory:  Labs Ordered and Resulted from Time of ED Arrival to Time of ED Departure - No data to display     Procedures       Emergency Department Course & Assessments:             Interventions:  Medications - No data to display     Independent Interpretation (X-rays, CTs, rhythm strip):  Right foot x-ray: Noted fracture of the base of right fifth metatarsal without significant displacement    Consultations/Discussion of Management or Tests:    ED Course as of 05/27/23 1100   Thu May 25, 2023   2025 I obtained history and examined the patient as noted above.    2033 Dr. Monsalve at bedside.    2036 I rechecked the patient and explained findings.        Social Determinants of Health affecting care:   None    Disposition:  The patient was discharged to home.     Impression & Plan    CMS Diagnoses: None    Medical Decision Making:    This is a 52-year-old male who presents after injuring his right foot.  X-ray imaging was obtained consistent with base of right fifth metatarsal fracture without significant displacement.  He is neurovascularly intact.  Patient will be placed in the boot nonweightbearing with crutches with close orthopedic follow-up in the next 5 days.  Patient will be offered pain medication.  Patient should also utilize anti-inflammatories elevating and icing foot.  At this time patient safe to discharge.  Red flag symptoms were discussed for  return back to the emergency department.    Diagnosis:    ICD-10-CM    1. Closed nondisplaced fracture of fifth metatarsal bone of right foot, initial encounter  S92.354A            Discharge Medications:  Discharge Medication List as of 5/25/2023  8:35 PM      START taking these medications    Details   oxyCODONE (ROXICODONE) 5 MG tablet Take 1 tablet (5 mg) by mouth every 6 hours as needed for pain, Disp-12 tablet, R-0, Local Print              5/25/2023   Modesto Alexis PA-C Kruger, Jacob C, PA-C  05/27/23 1101

## 2023-06-01 ENCOUNTER — TRANSFERRED RECORDS (OUTPATIENT)
Dept: HEALTH INFORMATION MANAGEMENT | Facility: CLINIC | Age: 53
End: 2023-06-01
Payer: COMMERCIAL

## 2023-06-06 NOTE — PROGRESS NOTES
[facility]  [address], [address]  [place] [address]  Phone: [phone]  Fax: [phone]  Primary Provider: Papo Carter  Pre-op Performing Provider: [facility]      PREOPERATIVE EVALUATION:  Today's date: 6/7/2023    Alessio Dallas is a 53 year old male who presents for a preoperative evaluation.       View : No data to display.              Surgical Information:  Surgery/Procedure: R foot surgery  Surgery Location: [facility]  Surgeon: MD Brianna  Surgery Date: 6/9/2023  Time of Surgery: TBD  Where patient plans to recover: At home with family  Fax number for surgical facility: [phone]        Subjective       HPI related to upcoming procedure: Suffered 5th MT fracture of the RIGHT foot about 2 weeks ago.     Does not have hypertension or other CVD risks.         6/5/2023     8:16 PM   Preop Questions   1. Have you ever had a heart attack or stroke? No   2. Have you ever had surgery on your heart or blood vessels, such as a stent placement, a coronary artery bypass, or surgery on an artery in your head, neck, heart, or legs? No   3. Do you have chest pain with activity? No   4. Do you have a history of  heart failure? No   5. Do you currently have a cold, bronchitis or symptoms of other infection? No   6. Do you have a cough, shortness of breath, or wheezing? No   7. Do you or anyone in your family have previous history of blood clots? No   8. Do you or does anyone in your family have a serious bleeding problem such as prolonged bleeding following surgeries or cuts? No   9. Have you ever had problems with anemia or been told to take iron pills? No   10. Have you had any abnormal blood loss such as black, tarry or bloody stools? No   11. Have you ever had a blood transfusion? No   12. Are you willing to have a blood transfusion if it is medically needed before, during, or after your surgery? Yes   13. Have you or any of your relatives  ever had problems with anesthesia? No   14. Do you have sleep apnea, excessive snoring or daytime drowsiness? No   15. Do you have any artifical heart valves or other implanted medical devices like a pacemaker, defibrillator, or continuous glucose monitor? No   16. Do you have artificial joints? No   17. Are you allergic to latex? No       Health Care Directive:  Patient does not have a Health Care Directive or Living Will: Discussed advance care planning with patient; information given to patient to review.    Preoperative Review of :   reviewed - no record of controlled substances prescribed.      Status of Chronic Conditions:  Anxiety - Stable. On Zoloft and Trazodone    Review of Systems  Constitutional, neuro, ENT, endocrine, pulmonary, cardiac, gastrointestinal, genitourinary, musculoskeletal, integument and psychiatric systems are negative, except as otherwise noted.    Patient Active Problem List    Diagnosis Date Noted     Wellness examination 02/04/2013     Priority: High     Ketogenic diet 10/04/2021     Priority: Medium     Hyperlipidemia LDL goal <130 10/04/2021     Priority: Medium     Irritable bowel syndrome 12/02/2014     Priority: Medium     Myopia 10/13/2014     Priority: Medium     Generalized anxiety disorder 02/04/2013     Priority: Medium     Insomnia 02/04/2013     Priority: Medium      Past Medical History:   Diagnosis Date     Anxiety      Seasonal allergies      No past surgical history on file.  Current Outpatient Medications   Medication Sig Dispense Refill     ketotifen (ZADITOR/REFRESH ANTI-ITCH) 0.025 % SOLN ophthalmic solution 1 drop 2 times daily as needed        mupirocin (BACTROBAN) 2 % external ointment Apply topically 2 times daily 15 g 0     sertraline (ZOLOFT) 50 MG tablet Take 1 tablet (50 mg) by mouth daily 30 tablet 0     traZODone (DESYREL) 50 MG tablet Take 1/2 tab by mouth at  - pt needs appt 45 tablet 0       Allergies   Allergen Reactions     Seasonal Allergies          Social History     Tobacco Use     Smoking status: Never     Smokeless tobacco: Never   Vaping Use     Vaping status: Not on file   Substance Use Topics     Alcohol use: Yes     Comment: 1 x per week       Objective     /78 (BP Location: Left arm, Patient Position: Sitting, Cuff Size: Adult Large)   Pulse 89   Temp 98.3  F (36.8  C) (Temporal)   Resp 18   Wt 76.2 kg (168 lb)   SpO2 98%   BMI 22.16 kg/m      Physical Exam    GENERAL APPEARANCE: healthy, alert and no distress     EYES: EOMI     NECK: no adenopathy, no asymmetry, masses, or scars and thyroid normal to palpation     RESP: lungs clear to auscultation - no rales, rhonchi or wheezes     CV: regular rates and rhythm, normal S1 S2, no S3 or S4 and no murmur, click or rub     ABDOMEN:  soft, nontender, no HSM or masses and bowel sounds normal     MS: RIGHT foot in [place]. [place] not removed. Otherwise, the extremities were normal-    SKIN: no suspicious lesions or rashes     NEURO: Normal strength and tone, sensory exam grossly normal, mentation intact and speech normal     PSYCH: mentation appears normal. and affect normal/bright     LYMPHATICS: No cervical adenopathy    X-rays of foot reviewed showing a Proximal 5th MT fracture with a line also going along the Epiphysis- Metaphysis junction toward the 4th MT.     Recent Labs   Lab Test 10/04/21  0921      POTASSIUM 4.2   CR 0.84        A/ Appears well for surgery for 5th Metatarsal fracture    P/  Approved to proceed with surgery.       Signed Electronically by: Rosendo Jaquez MD  Copy of this evaluation report is provided to requesting physician.

## 2023-06-07 ENCOUNTER — OFFICE VISIT (OUTPATIENT)
Dept: FAMILY MEDICINE | Facility: CLINIC | Age: 53
End: 2023-06-07
Payer: COMMERCIAL

## 2023-06-07 VITALS
DIASTOLIC BLOOD PRESSURE: 78 MMHG | OXYGEN SATURATION: 98 % | WEIGHT: 168 LBS | HEART RATE: 89 BPM | SYSTOLIC BLOOD PRESSURE: 117 MMHG | RESPIRATION RATE: 18 BRPM | TEMPERATURE: 98.3 F | BODY MASS INDEX: 22.16 KG/M2

## 2023-06-07 DIAGNOSIS — Z01.818 PREOP GENERAL PHYSICAL EXAM: Primary | ICD-10-CM

## 2023-06-07 DIAGNOSIS — S92.354A CLOSED NONDISPLACED FRACTURE OF FIFTH METATARSAL BONE OF RIGHT FOOT, INITIAL ENCOUNTER: ICD-10-CM

## 2023-06-07 NOTE — NURSING NOTE
53 year old  Chief Complaint   Patient presents with     Pre-Op Exam       Blood pressure 117/78, pulse 89, temperature 98.3  F (36.8  C), temperature source Temporal, resp. rate 18, weight 76.2 kg (168 lb), SpO2 98 %. Body mass index is 22.16 kg/m .  Patient Active Problem List   Diagnosis     Wellness examination     Generalized anxiety disorder     Insomnia     Myopia     Irritable bowel syndrome     Ketogenic diet     Hyperlipidemia LDL goal <130       Wt Readings from Last 2 Encounters:   06/07/23 76.2 kg (168 lb)   05/25/23 79.4 kg (175 lb)     BP Readings from Last 3 Encounters:   06/07/23 117/78   05/25/23 127/79   10/04/21 129/80         Current Outpatient Medications   Medication     ketotifen (ZADITOR/REFRESH ANTI-ITCH) 0.025 % SOLN ophthalmic solution     mupirocin (BACTROBAN) 2 % external ointment     sertraline (ZOLOFT) 50 MG tablet     traZODone (DESYREL) 50 MG tablet     No current facility-administered medications for this visit.       Social History     Tobacco Use     Smoking status: Never     Smokeless tobacco: Never   Substance Use Topics     Alcohol use: Yes     Comment: 1 x per week     Drug use: No       Health Maintenance Due   Topic Date Due     ADVANCE CARE PLANNING  Never done     HEPATITIS B IMMUNIZATION (1 of 3 - 3-dose series) Never done     COLORECTAL CANCER SCREENING  Never done     HIV SCREENING  Never done     HEPATITIS C SCREENING  Never done     CLAU ASSESSMENT  01/04/2022     PHQ-9  01/04/2022     ZOSTER IMMUNIZATION (1 of 2) Never done     YEARLY PREVENTIVE VISIT  10/04/2022     PHQ-2 (once per calendar year)  01/01/2023       No results found for: PAP      June 7, 2023 4:18 PM

## 2023-06-19 ENCOUNTER — TRANSFERRED RECORDS (OUTPATIENT)
Dept: HEALTH INFORMATION MANAGEMENT | Facility: CLINIC | Age: 53
End: 2023-06-19
Payer: COMMERCIAL

## 2023-06-30 ENCOUNTER — TRANSFERRED RECORDS (OUTPATIENT)
Dept: HEALTH INFORMATION MANAGEMENT | Facility: CLINIC | Age: 53
End: 2023-06-30
Payer: COMMERCIAL

## 2023-07-12 ENCOUNTER — OFFICE VISIT (OUTPATIENT)
Dept: FAMILY MEDICINE | Facility: CLINIC | Age: 53
End: 2023-07-12
Payer: COMMERCIAL

## 2023-07-12 ENCOUNTER — LAB (OUTPATIENT)
Dept: FAMILY MEDICINE | Facility: CLINIC | Age: 53
End: 2023-07-12

## 2023-07-12 VITALS
OXYGEN SATURATION: 99 % | HEIGHT: 72 IN | HEART RATE: 93 BPM | DIASTOLIC BLOOD PRESSURE: 76 MMHG | TEMPERATURE: 98.4 F | SYSTOLIC BLOOD PRESSURE: 111 MMHG | WEIGHT: 173 LBS | BODY MASS INDEX: 23.43 KG/M2

## 2023-07-12 DIAGNOSIS — Z13.1 SCREENING FOR DIABETES MELLITUS: ICD-10-CM

## 2023-07-12 DIAGNOSIS — G47.00 INSOMNIA, UNSPECIFIED TYPE: ICD-10-CM

## 2023-07-12 DIAGNOSIS — F41.1 GAD (GENERALIZED ANXIETY DISORDER): ICD-10-CM

## 2023-07-12 DIAGNOSIS — Z00.00 WELLNESS EXAMINATION: Primary | ICD-10-CM

## 2023-07-12 DIAGNOSIS — Z13.220 SCREENING FOR LIPID DISORDERS: ICD-10-CM

## 2023-07-12 DIAGNOSIS — Z12.5 SCREENING FOR PROSTATE CANCER: ICD-10-CM

## 2023-07-12 DIAGNOSIS — Z12.11 SCREENING FOR COLON CANCER: ICD-10-CM

## 2023-07-12 DIAGNOSIS — E78.5 HYPERLIPIDEMIA LDL GOAL <130: ICD-10-CM

## 2023-07-12 DIAGNOSIS — S92.354S CLOSED NONDISPLACED FRACTURE OF FIFTH METATARSAL BONE OF RIGHT FOOT, SEQUELA: ICD-10-CM

## 2023-07-12 PROCEDURE — 80061 LIPID PANEL: CPT | Mod: ORL | Performed by: FAMILY MEDICINE

## 2023-07-12 PROCEDURE — G0103 PSA SCREENING: HCPCS | Mod: ORL | Performed by: FAMILY MEDICINE

## 2023-07-12 PROCEDURE — 80048 BASIC METABOLIC PNL TOTAL CA: CPT | Mod: ORL | Performed by: FAMILY MEDICINE

## 2023-07-12 RX ORDER — TRAZODONE HYDROCHLORIDE 50 MG/1
TABLET, FILM COATED ORAL
Qty: 45 TABLET | Refills: 4 | Status: SHIPPED | OUTPATIENT
Start: 2023-07-12 | End: 2024-09-12

## 2023-07-12 ASSESSMENT — ANXIETY QUESTIONNAIRES
GAD7 TOTAL SCORE: 5
IF YOU CHECKED OFF ANY PROBLEMS ON THIS QUESTIONNAIRE, HOW DIFFICULT HAVE THESE PROBLEMS MADE IT FOR YOU TO DO YOUR WORK, TAKE CARE OF THINGS AT HOME, OR GET ALONG WITH OTHER PEOPLE: NOT DIFFICULT AT ALL
2. NOT BEING ABLE TO STOP OR CONTROL WORRYING: NOT AT ALL
1. FEELING NERVOUS, ANXIOUS, OR ON EDGE: SEVERAL DAYS
5. BEING SO RESTLESS THAT IT IS HARD TO SIT STILL: NOT AT ALL
6. BECOMING EASILY ANNOYED OR IRRITABLE: SEVERAL DAYS
GAD7 TOTAL SCORE: 5
3. WORRYING TOO MUCH ABOUT DIFFERENT THINGS: SEVERAL DAYS
7. FEELING AFRAID AS IF SOMETHING AWFUL MIGHT HAPPEN: NOT AT ALL

## 2023-07-12 ASSESSMENT — ENCOUNTER SYMPTOMS
CONSTIPATION: 0
FEVER: 0
HEARTBURN: 0
MYALGIAS: 0
SORE THROAT: 0
ARTHRALGIAS: 0
NERVOUS/ANXIOUS: 0
HEMATURIA: 0
DYSURIA: 0
CHILLS: 0
EYE PAIN: 0
JOINT SWELLING: 0
SHORTNESS OF BREATH: 0
DIZZINESS: 0
COUGH: 0
FREQUENCY: 0
HEMATOCHEZIA: 0
WEAKNESS: 0
PARESTHESIAS: 0
NAUSEA: 0
PALPITATIONS: 0
DIARRHEA: 0
ABDOMINAL PAIN: 0
HEADACHES: 1

## 2023-07-12 ASSESSMENT — PATIENT HEALTH QUESTIONNAIRE - PHQ9
5. POOR APPETITE OR OVEREATING: MORE THAN HALF THE DAYS
SUM OF ALL RESPONSES TO PHQ QUESTIONS 1-9: 5

## 2023-07-12 NOTE — PROGRESS NOTES
"CHIEF COMPLAINT: Annual Wellness Exam      HISTORY OF PRESENT ILLNESS:  Alessio Dallas is a 53 year old male who presents for an annual wellness visit.  Overall, he is doing well. He wears glasses and eye care is up to date. Hearing is stable. His dental care is up to date. His BP is 111/76 today. Body mass index is 23.46 kg/m . From an immunization standpoint, he is due for the Shingrix vaccine series.      He is due for baseline colon cancer screening, prefers Cologuard. He is due for baseline prostate cancer screening.     Alessio has a history of hyperlipidemia and his most recent LDL level was 209 on 10/4/2021, down from 242 on 6/29/2020. He historically declines statin medication and we have previously discussed meeting with a dietician about reducing these non-pharmacologically. CT Calcium Screening on 10/21/2021 found a total Agatston calcium score of 0. The ascending aorta did appear to be borderline dilated measuring 3.55 x 3.49 cm at the level of the pulmonary trunk on this non-contrast study. He has a family history of a hole in the heart, which has been found in two of his paternal uncles.    Alessio has anxiety that has typically been under fairly good control. He continues to take sertraline 50 mg once daily for that and trazodone 25 mg (0.5 of a 50 mg pill) at bedtime to help with insomnia. That combination works quite well for him. He would like to continue both medications.     PHQ-9 = 5  CLAU-7 = 5    Alessio had surgery about 4.5 weeks ago on his right foot for a closed non-displaced fracture of the fifth metatarsal bone of his right foot. He notes he had a sharp pain while playing pickle ball which he ignored, then later twisted his foot while climbing stairs and presented to the ED. He states that he woke from surgery \"feeling like I had a really nice night's sleep.\" He has recovered well, but does now report some right foot pain wrapping around his right lateral malleolus. He suspects peroneal " "tendonitis. He has been \"walking weird\" recently due to his pain. He has not tried applying Voltaren gel. His first PT appointment status post his surgery is scheduled for next week.    Alessio would like me to perform a skin check today.      FAMILY, SOCIAL AND PAST SURGICAL HISTORIES: Updated      MEDICATIONS:   Carefully Reviewed      REVIEW OF SYSTEMS:  10-point review of systems negative unless otherwise stated in the HPI.       OBJECTIVE:    EXAM:    GENERAL: He is in no distress.  Affect is upbeat.   Alert and oriented x3  /76 (BP Location: Right arm, Patient Position: Sitting, Cuff Size: Adult Regular)   Pulse 93   Temp 98.4  F (36.9  C) (Temporal)   Ht 1.829 m (6')   Wt 78.5 kg (173 lb)   SpO2 99%   BMI 23.46 kg/m    HEENT:  Head is normocephalic, atraumatic. Ears clear bilaterally. No pain with palpation of the frontal or maxillary sinuses.  Eyes are grossly normal.  Nose is free of any congestion or discharge.  Teeth in good repair.  Mucous membranes are moist.  Throat looks benign.  Neck is supple without adenopathy or thyromegaly.  No carotid bruits are heard, no JVD.   BACK:  Smooth and straight.  No pain to percussion.  No CVA tenderness.   LUNGS:  Clear to auscultation bilaterally.   HEART:  Regular rate and rhythm without murmur.   RIGHT FOOT: Plantar flexion strength 5/5, dorsiflexion strength 5/5 without pain. No pain with eversion. \"No pain, but it's sore\" near the fibula with inversion.   EXTREMITIES:  Ankles free of any edema.   SKIN:  Warm and dry. Careful survey revealed a number of benign changes including SKs, compound nevi, and cherry angiomas. No suspicious lesions were noted.      LABORATORY DATA: Labs, pending.       ASSESSMENT AND PLAN:   1. Annual Wellness Exam: Up to date with healthcare maintenance strategies.  2. Hyperlipidemia: Not fasting. Lipid panel ordered.   3. Screening for diabetes mellitus: BMP ordered.   4. Generalized anxiety disorder: Refilled sertraline x1 " year.   5. Insomnia: Refilled trazodone x1 year.   6. Screening for colon cancer: Cologuard ordered.  7. Screening for prostate cancer: PSA ordered.   8. Recent right foot surgery: Recovering well. Try OTC Voltaren gel, up to 2x/day, along with PT (starting next week).  9. Follow up in 1 year or call if there are any questions or concerns.      I, Michelle Agudelo, am serving as a scribe to document services personally performed by Dr. Papo Carter, based on data collection and the provider's statements to me. Dr. Carter has reviewed, edited, and approved the above note.      I, Dr. Papo Carter, have interviewed and examined this patient, and I have thoroughly reviewed and edited this note and agree with its contents.

## 2023-07-12 NOTE — NURSING NOTE
53 year old  Chief Complaint   Patient presents with     Physical     Musculoskeletal Problem     Recent (4.5 weeks ago) surgery on right foot, patient suspects perineal tendonitis and would like confirmation            Blood pressure 111/76, pulse 93, temperature 98.4  F (36.9  C), temperature source Temporal, height 1.829 m (6'), weight 78.5 kg (173 lb), SpO2 99 %. Body mass index is 23.46 kg/m .  Patient Active Problem List   Diagnosis     Wellness examination     Generalized anxiety disorder     Insomnia     Myopia     Irritable bowel syndrome     Ketogenic diet     Hyperlipidemia LDL goal <130            Wt Readings from Last 2 Encounters:   07/12/23 78.5 kg (173 lb)   06/07/23 76.2 kg (168 lb)     BP Readings from Last 3 Encounters:   07/12/23 111/76   06/07/23 117/78   05/25/23 127/79              Current Outpatient Medications   Medication     ketotifen (ZADITOR/REFRESH ANTI-ITCH) 0.025 % SOLN ophthalmic solution     mupirocin (BACTROBAN) 2 % external ointment     sertraline (ZOLOFT) 50 MG tablet     traZODone (DESYREL) 50 MG tablet     No current facility-administered medications for this visit.            Social History     Tobacco Use     Smoking status: Never     Smokeless tobacco: Never   Substance Use Topics     Alcohol use: Yes     Comment: 1 x per week     Drug use: No            Health Maintenance Due   Topic Date Due     ADVANCE CARE PLANNING  Never done     HEPATITIS B IMMUNIZATION (1 of 3 - 3-dose series) Never done     COLORECTAL CANCER SCREENING  Never done     HIV SCREENING  Never done     HEPATITIS C SCREENING  Never done     ZOSTER IMMUNIZATION (1 of 2) Never done            No results found for: PAP           July 12, 2023 3:30 PM

## 2023-07-13 LAB
ANION GAP SERPL CALCULATED.3IONS-SCNC: 12 MMOL/L (ref 7–15)
BUN SERPL-MCNC: 16.9 MG/DL (ref 6–20)
CALCIUM SERPL-MCNC: 9.7 MG/DL (ref 8.6–10)
CHLORIDE SERPL-SCNC: 102 MMOL/L (ref 98–107)
CHOLEST SERPL-MCNC: 252 MG/DL
CREAT SERPL-MCNC: 0.99 MG/DL (ref 0.67–1.17)
DEPRECATED HCO3 PLAS-SCNC: 24 MMOL/L (ref 22–29)
GFR SERPL CREATININE-BSD FRML MDRD: >90 ML/MIN/1.73M2
GLUCOSE SERPL-MCNC: 88 MG/DL (ref 70–99)
HDLC SERPL-MCNC: 61 MG/DL
LDLC SERPL CALC-MCNC: 177 MG/DL
NONHDLC SERPL-MCNC: 191 MG/DL
POTASSIUM SERPL-SCNC: 3.9 MMOL/L (ref 3.4–5.3)
PSA SERPL DL<=0.01 NG/ML-MCNC: 0.26 NG/ML (ref 0–3.5)
SODIUM SERPL-SCNC: 138 MMOL/L (ref 136–145)
TRIGL SERPL-MCNC: 70 MG/DL

## 2023-07-21 ENCOUNTER — TRANSFERRED RECORDS (OUTPATIENT)
Dept: HEALTH INFORMATION MANAGEMENT | Facility: CLINIC | Age: 53
End: 2023-07-21
Payer: COMMERCIAL

## 2023-07-31 LAB — NONINV COLON CA DNA+OCC BLD SCRN STL QL: NEGATIVE

## 2023-08-31 ENCOUNTER — TRANSFERRED RECORDS (OUTPATIENT)
Dept: HEALTH INFORMATION MANAGEMENT | Facility: CLINIC | Age: 53
End: 2023-08-31
Payer: COMMERCIAL

## 2024-03-09 ENCOUNTER — OFFICE VISIT (OUTPATIENT)
Dept: FAMILY MEDICINE | Facility: CLINIC | Age: 54
End: 2024-03-09
Payer: COMMERCIAL

## 2024-03-09 VITALS
OXYGEN SATURATION: 99 % | DIASTOLIC BLOOD PRESSURE: 119 MMHG | HEART RATE: 101 BPM | TEMPERATURE: 96.2 F | SYSTOLIC BLOOD PRESSURE: 156 MMHG

## 2024-03-09 DIAGNOSIS — J02.9 ACUTE SORE THROAT: Primary | ICD-10-CM

## 2024-03-09 LAB — DEPRECATED S PYO AG THROAT QL EIA: NEGATIVE

## 2024-03-09 PROCEDURE — 99213 OFFICE O/P EST LOW 20 MIN: CPT

## 2024-03-09 PROCEDURE — 87651 STREP A DNA AMP PROBE: CPT

## 2024-03-09 NOTE — PROGRESS NOTES
Assessment & Plan     Acute sore throat    - Streptococcus A Rapid Scr w Reflx to PCR  - Group A Streptococcus PCR Throat Swab  Rapid strep negative  Symptomatic treatment for now  Strep PCR pending.       Return in about 1 week (around 3/16/2024), or if symptoms worsen or fail to improve.    Cameron Mccallum is a 53 year old, presenting for the following health issues:  Urgent Care and Pharyngitis (Sore throat 2-3 days with little bumps in the back. OTC - nothing. )    HPI       Acute Illness  Acute illness concerns: ST  Onset/Duration: 2 days  Symptoms:  Fever: No  Chills/Sweats: No  Headache (location?): No  Sinus Pressure: No  Conjunctivitis:  No  Ear Pain: no  Rhinorrhea: No  Congestion: No  Sore Throat: YES  Cough: no  Wheeze: No  Decreased Appetite: No  Nausea: No  Fatigue/Achiness: No  Sick/Strep Exposure: No  Therapies tried and outcome: None        Review of Systems  Constitutional, HEENT, cardiovascular, pulmonary, gi and gu systems are negative, except as otherwise noted.      Objective    BP (!) 156/119   Pulse 101   Temp (!) 96.2  F (35.7  C) (Tympanic)   SpO2 99%   There is no height or weight on file to calculate BMI.  Physical Exam   GENERAL: alert and no distress  EYES: Eyes grossly normal to inspection, PERRL and conjunctivae and sclerae normal  HENT: normal cephalic/atraumatic, ear canals and TM's normal, nose and mouth without ulcers or lesions, oropharynx clear, oral mucous membranes moist, and mild erythema posterior oropharynx  NECK: no adenopathy  RESP: lungs clear to auscultation - no rales, rhonchi or wheezes  CV: regular rates and rhythm, normal S1 S2, no S3 or S4, and no murmur, click or rub    Results for orders placed or performed in visit on 03/09/24   Streptococcus A Rapid Scr w Reflx to PCR     Status: Normal    Specimen: Throat; Swab   Result Value Ref Range    Group A Strep antigen Negative Negative            [facility].   Signed Electronically by: [facility]

## 2024-03-09 NOTE — PATIENT INSTRUCTIONS
Try an allergy pill like Claritin or Zyrtec to see if this helps with the post nasal drip  Tea with honey and salt water gargles for symptom management

## 2024-03-10 LAB — GROUP A STREP BY PCR: NOT DETECTED

## 2024-03-14 ENCOUNTER — TRANSFERRED RECORDS (OUTPATIENT)
Dept: HEALTH INFORMATION MANAGEMENT | Facility: CLINIC | Age: 54
End: 2024-03-14
Payer: COMMERCIAL

## 2024-09-12 DIAGNOSIS — F41.1 GAD (GENERALIZED ANXIETY DISORDER): ICD-10-CM

## 2024-09-12 DIAGNOSIS — G47.00 INSOMNIA, UNSPECIFIED TYPE: ICD-10-CM

## 2024-09-13 RX ORDER — TRAZODONE HYDROCHLORIDE 50 MG/1
TABLET, FILM COATED ORAL
Qty: 45 TABLET | Refills: 0 | Status: SHIPPED | OUTPATIENT
Start: 2024-09-13

## 2024-09-13 NOTE — TELEPHONE ENCOUNTER
Medication requested: sertraline (ZOLOFT) 50 MG tablet   Last office visit: 7/12/23  [facility] appointments: 10/30/24  Medication last refilled: 7/12/23; 90 + 4 refills  Last qualifying labs:    7/12/2023  3:19 PM   PHQ-9 / CLAU-7 Scores 8/2015 to present    CLAU-7 Score DocFlow 5    PHQ-9 Score DocFlow 5      Medication requested: traZODone (DESYREL) 50 MG tablet   Medication last refilled: 7/12/23; 45 + 4 refills  Last qualifying labs:

## 2024-10-19 ENCOUNTER — HEALTH MAINTENANCE LETTER (OUTPATIENT)
Age: 54
End: 2024-10-19

## 2024-10-28 SDOH — HEALTH STABILITY: PHYSICAL HEALTH: ON AVERAGE, HOW MANY DAYS PER WEEK DO YOU ENGAGE IN MODERATE TO STRENUOUS EXERCISE (LIKE A BRISK WALK)?: 6 DAYS

## 2024-10-28 SDOH — HEALTH STABILITY: PHYSICAL HEALTH: ON AVERAGE, HOW MANY MINUTES DO YOU ENGAGE IN EXERCISE AT THIS LEVEL?: 30 MIN

## 2024-10-28 ASSESSMENT — ANXIETY QUESTIONNAIRES
8. IF YOU CHECKED OFF ANY PROBLEMS, HOW DIFFICULT HAVE THESE MADE IT FOR YOU TO DO YOUR WORK, TAKE CARE OF THINGS AT HOME, OR GET ALONG WITH OTHER PEOPLE?: NOT DIFFICULT AT ALL
GAD7 TOTAL SCORE: 4
GAD7 TOTAL SCORE: 4
7. FEELING AFRAID AS IF SOMETHING AWFUL MIGHT HAPPEN: NOT AT ALL
7. FEELING AFRAID AS IF SOMETHING AWFUL MIGHT HAPPEN: NOT AT ALL
1. FEELING NERVOUS, ANXIOUS, OR ON EDGE: SEVERAL DAYS
3. WORRYING TOO MUCH ABOUT DIFFERENT THINGS: SEVERAL DAYS
IF YOU CHECKED OFF ANY PROBLEMS ON THIS QUESTIONNAIRE, HOW DIFFICULT HAVE THESE PROBLEMS MADE IT FOR YOU TO DO YOUR WORK, TAKE CARE OF THINGS AT HOME, OR GET ALONG WITH OTHER PEOPLE: NOT DIFFICULT AT ALL
GAD7 TOTAL SCORE: 4
4. TROUBLE RELAXING: SEVERAL DAYS
6. BECOMING EASILY ANNOYED OR IRRITABLE: SEVERAL DAYS
5. BEING SO RESTLESS THAT IT IS HARD TO SIT STILL: NOT AT ALL
2. NOT BEING ABLE TO STOP OR CONTROL WORRYING: NOT AT ALL

## 2024-10-28 ASSESSMENT — PATIENT HEALTH QUESTIONNAIRE - PHQ9
SUM OF ALL RESPONSES TO PHQ QUESTIONS 1-9: 4
10. IF YOU CHECKED OFF ANY PROBLEMS, HOW DIFFICULT HAVE THESE PROBLEMS MADE IT FOR YOU TO DO YOUR WORK, TAKE CARE OF THINGS AT HOME, OR GET ALONG WITH OTHER PEOPLE: NOT DIFFICULT AT ALL
SUM OF ALL RESPONSES TO PHQ QUESTIONS 1-9: 4

## 2024-10-28 ASSESSMENT — SOCIAL DETERMINANTS OF HEALTH (SDOH): HOW OFTEN DO YOU GET TOGETHER WITH FRIENDS OR RELATIVES?: TWICE A WEEK

## 2024-10-29 NOTE — PROGRESS NOTES
Preventive Care Visit  [facility]  Papo Carter MD, Family Medicine  Oct 30, 2024    Subjective   Alessio is a 54 year old, presenting for the following:  Physical    HPI    Alessio is a 54-year-old here today for his annual wellness visit.  Overall, he is doing quite well.    He would like to initiate the shingles vaccine series but will likely do it at a clinic/pharmacy that is closer to his house, so that he can do it on a Friday.  In addition, he will get both the flu and COVID vaccines in the near future.    We discussed the fact that his father had colon cancer and Alessio had previously received a Cologuard vaccine about a year ago.  We discussed the fact that with this new knowledge, he should get a colonoscopy in approximately 2 years.    He is a bit concerned about his skin.  He like me to do a skin survey and I will do so today.    He needs a couple of refills, including his sertraline and mupirocin.    Overall, he is up-to-date with all healthcare maintenance strategies and once he gets the immunizations he will not have any care gaps.        10/28/2024   General Health   How would you rate your overall physical health? Excellent   Feel stress (tense, anxious, or unable to sleep) To some extent      (!) STRESS CONCERN      10/28/2024   Nutrition   Three or more servings of calcium each day? (!) I DON'T KNOW   Diet: Other   If other, please elaborate: Ketogenic   How many servings of fruit and vegetables per day? 4 or more   How many sweetened beverages each day? 0-1            10/28/2024   Exercise   Days per week of moderate/strenous exercise 6 days   Average minutes spent exercising at this level 30 min            10/28/2024   Social Factors   Frequency of gathering with friends or relatives Twice a week   Worry food won't last until get money to buy more No   Food not last or not have enough money for food? No   Do you have housing? (Housing is defined as stable permanent housing and does not  include staying ouside in a car, in a tent, in an abandoned building, in an overnight shelter, or couch-surfing.) Yes   Are you worried about losing your housing? No   Lack of transportation? No   Unable to get utilities (heat,electricity)? No            10/28/2024   Fall Risk   Fallen 2 or more times in the past year? No    Trouble with walking or balance? No        Patient-reported          10/28/2024   Dental   Dentist two times every year? Yes            10/28/2024   TB Screening   Were you born outside of the [place]? No      Today's PHQ-9 Score:       10/28/2024     1:00 PM   PHQ-9 SCORE   PHQ-9 Total Score MyChart 4 (Minimal depression)   PHQ-9 Total Score 4        Patient-reported         10/28/2024   Substance Use   Alcohol more than 3/day or more than 7/wk No   Do you use any other substances recreationally? No        Social History     Tobacco Use    Smoking status: Never     Passive exposure: Never    Smokeless tobacco: Never   Substance Use Topics    Alcohol use: Yes     Comment: 1 x per week    Drug use: No         10/28/2024   One time HIV Screening   Previous HIV test? No          10/28/2024   STI Screening   New sexual partner(s) since last STI/HIV test? No      ASCVD Risk   The 10-year ASCVD risk score (Devi SESAY, et al., 2019) is: 6.6%    Values used to calculate the score:      Age: 54 years      Sex: Male      Is Non- : No      Diabetic: No      Tobacco smoker: No      Systolic Blood Pressure: 156 mmHg      Is BP treated: No      HDL Cholesterol: 61 mg/dL      Total Cholesterol: 252 mg/dL    Review of Systems  Constitutional, neuro, ENT, endocrine, pulmonary, cardiac, gastrointestinal, genitourinary, musculoskeletal, integument and psychiatric systems are negative, except as otherwise noted.     Objective    Exam  /79 (BP Location: Left arm, Patient Position: Sitting, Cuff Size: Adult Regular)   Pulse 84   Temp 97.7  F (36.5  C) (Skin)   Resp 14   Ht 1.847 m  "(6' 0.72\")   Wt 79.8 kg (176 lb)   SpO2 98%   BMI 23.40 kg/m       Estimated body mass index is 23.4 kg/m  as calculated from the following:    Height as of this encounter: 1.847 m (6' 0.72\").    Weight as of this encounter: 79.8 kg (176 lb).    Physical Exam  GENERAL: alert and no distress  EYES: Eyes grossly normal to inspection, PERRL and conjunctivae and sclerae normal  HENT: ear canals and TM's normal, nose and mouth without ulcers or lesions  NECK: no adenopathy, no asymmetry, masses, or scars  RESP: lungs clear to auscultation - no rales, rhonchi or wheezes  CV: regular rate and rhythm, normal S1 S2, no S3 or S4, no murmur, click or rub, no peripheral edema  MS: no gross musculoskeletal defects noted, no edema  SKIN: careful skin survey conducted and no suspicious lesions or rashes were noted; reassurance given   NEURO: Normal strength and tone, mentation intact and speech normal  PSYCH: mentation appears normal, affect normal/bright    Laboratory studies: Basic metabolic panel, lipid panel, hepatitis C antibody, and PSA, all pending      Assessment/Plan:    Alessio is a 54-year-old here today for his annual wellness visit.  Overall, he is doing very well and does not have any significant concerns.    Recent knowledge that his father actually had colon cancer.  Given that, when Alessio needs colon cancer screening in 2 years, we will go ahead with a full colonoscopy rather than a Cologuard test.    No suspicious skin lesions.  Reassurance given.    Shingles, flu, and COVID vaccines all recommended.    Refill sent in for his mupirocin and sertraline.    Look forward to seeing Alessio back in approximately 1 year.  He will reach out the meantime with any questions or concerns.      Signed Electronically by: Papo Carter MD  "

## 2024-10-30 ENCOUNTER — OFFICE VISIT (OUTPATIENT)
Dept: FAMILY MEDICINE | Facility: CLINIC | Age: 54
End: 2024-10-30
Payer: COMMERCIAL

## 2024-10-30 VITALS
WEIGHT: 176 LBS | SYSTOLIC BLOOD PRESSURE: 127 MMHG | DIASTOLIC BLOOD PRESSURE: 79 MMHG | OXYGEN SATURATION: 98 % | HEIGHT: 73 IN | RESPIRATION RATE: 14 BRPM | TEMPERATURE: 97.7 F | BODY MASS INDEX: 23.33 KG/M2 | HEART RATE: 84 BPM

## 2024-10-30 DIAGNOSIS — L73.9 FOLLICULITIS: ICD-10-CM

## 2024-10-30 DIAGNOSIS — E78.5 HYPERLIPIDEMIA LDL GOAL <130: ICD-10-CM

## 2024-10-30 DIAGNOSIS — F41.1 GAD (GENERALIZED ANXIETY DISORDER): ICD-10-CM

## 2024-10-30 DIAGNOSIS — Z23 NEED FOR SHINGLES VACCINE: ICD-10-CM

## 2024-10-30 DIAGNOSIS — Z00.00 WELLNESS EXAMINATION: Primary | ICD-10-CM

## 2024-10-30 DIAGNOSIS — Z01.84 IMMUNITY STATUS TESTING: ICD-10-CM

## 2024-10-30 DIAGNOSIS — Z12.5 SCREENING FOR PROSTATE CANCER: ICD-10-CM

## 2024-10-30 DIAGNOSIS — Z23 NEED FOR PROPHYLACTIC VACCINATION AND INOCULATION AGAINST INFLUENZA: ICD-10-CM

## 2024-10-30 DIAGNOSIS — G47.00 INSOMNIA, UNSPECIFIED TYPE: ICD-10-CM

## 2024-10-30 DIAGNOSIS — Z13.1 SCREENING FOR DIABETES MELLITUS: ICD-10-CM

## 2024-10-30 DIAGNOSIS — Z23 HIGH PRIORITY FOR 2019-NCOV VACCINE: ICD-10-CM

## 2024-10-30 LAB
ANION GAP SERPL CALCULATED.3IONS-SCNC: 10 MMOL/L (ref 7–15)
BUN SERPL-MCNC: 15.7 MG/DL (ref 6–20)
CALCIUM SERPL-MCNC: 9.8 MG/DL (ref 8.8–10.4)
CHLORIDE SERPL-SCNC: 104 MMOL/L (ref 98–107)
CHOLEST SERPL-MCNC: 243 MG/DL
CREAT SERPL-MCNC: 0.88 MG/DL (ref 0.67–1.17)
EGFRCR SERPLBLD CKD-EPI 2021: >90 ML/MIN/1.73M2
FASTING STATUS PATIENT QL REPORTED: YES
FASTING STATUS PATIENT QL REPORTED: YES
GLUCOSE SERPL-MCNC: 93 MG/DL (ref 70–99)
HCO3 SERPL-SCNC: 26 MMOL/L (ref 22–29)
HCV AB SERPL QL IA: NONREACTIVE
HDLC SERPL-MCNC: 55 MG/DL
LDLC SERPL CALC-MCNC: 178 MG/DL
NONHDLC SERPL-MCNC: 188 MG/DL
POTASSIUM SERPL-SCNC: 4.5 MMOL/L (ref 3.4–5.3)
PSA SERPL DL<=0.01 NG/ML-MCNC: 0.35 NG/ML (ref 0–3.5)
SODIUM SERPL-SCNC: 140 MMOL/L (ref 135–145)
TRIGL SERPL-MCNC: 52 MG/DL

## 2024-10-30 PROCEDURE — 80048 BASIC METABOLIC PNL TOTAL CA: CPT | Mod: ORL | Performed by: FAMILY MEDICINE

## 2024-10-30 PROCEDURE — 86803 HEPATITIS C AB TEST: CPT | Mod: ORL | Performed by: FAMILY MEDICINE

## 2024-10-30 PROCEDURE — 80061 LIPID PANEL: CPT | Mod: ORL | Performed by: FAMILY MEDICINE

## 2024-10-30 PROCEDURE — G0103 PSA SCREENING: HCPCS | Mod: ORL | Performed by: FAMILY MEDICINE

## 2024-10-30 RX ORDER — MUPIROCIN 20 MG/G
OINTMENT TOPICAL 2 TIMES DAILY
Qty: 30 G | Refills: 1 | Status: SHIPPED | OUTPATIENT
Start: 2024-10-30

## 2024-10-30 RX ORDER — TRAZODONE HYDROCHLORIDE 50 MG/1
TABLET, FILM COATED ORAL
Qty: 45 TABLET | Refills: 4 | Status: SHIPPED | OUTPATIENT
Start: 2024-10-30

## 2024-10-30 NOTE — NURSING NOTE
"Alessio  54 year old    Chief Complaint   Patient presents with    Physical            Blood pressure 127/79, pulse 84, temperature 97.7  F (36.5  C), temperature source Skin, resp. rate 14, height 1.847 m (6' 0.72\"), weight 79.8 kg (176 lb), SpO2 98%. Body mass index is 23.4 kg/m .    Patient Active Problem List   Diagnosis    Wellness examination    Generalized anxiety disorder    Insomnia    Myopia    Irritable bowel syndrome    Ketogenic diet    Hyperlipidemia LDL goal <130              Wt Readings from Last 2 Encounters:   10/30/24 79.8 kg (176 lb)   07/12/23 78.5 kg (173 lb)       BP Readings from Last 3 Encounters:   10/30/24 127/79   03/09/24 (!) 156/119   07/12/23 111/76                Current Outpatient Medications   Medication Sig Dispense Refill    ketotifen (ZADITOR/REFRESH ANTI-ITCH) 0.025 % SOLN ophthalmic solution 1 drop 2 times daily as needed       mupirocin (BACTROBAN) 2 % external ointment Apply topically 2 times daily 15 g 0    sertraline (ZOLOFT) 50 MG tablet Take 1 tablet (50 mg) by mouth daily. 90 tablet 0    traZODone (DESYREL) 50 MG tablet Take 1/2 tab by mouth at hs 45 tablet 0     No current facility-administered medications for this visit.              Social History     Tobacco Use    Smoking status: Never     Passive exposure: Never    Smokeless tobacco: Never   Substance Use Topics    Alcohol use: Not Currently     Alcohol/week: 0.0 - 1.0 standard drinks of alcohol    Drug use: No              Health Maintenance Due   Topic Date Due    HIV SCREENING  Never done    HEPATITIS C SCREENING  Never done    HEPATITIS B IMMUNIZATION (1 of 3 - 19+ 3-dose series) Never done    ZOSTER IMMUNIZATION (1 of 2) Never done    YEARLY PREVENTIVE VISIT  07/12/2024    LIPID  07/12/2024    INFLUENZA VACCINE (1) 09/01/2024    COVID-19 Vaccine (5 - 2024-25 season) 09/01/2024            No results found for: \"PAP\"           October 30, 2024 8:25 AM    "

## 2025-03-11 ENCOUNTER — TRANSFERRED RECORDS (OUTPATIENT)
Dept: HEALTH INFORMATION MANAGEMENT | Facility: CLINIC | Age: 55
End: 2025-03-11
Payer: COMMERCIAL

## 2025-03-12 ENCOUNTER — TRANSFERRED RECORDS (OUTPATIENT)
Dept: HEALTH INFORMATION MANAGEMENT | Facility: CLINIC | Age: 55
End: 2025-03-12
Payer: COMMERCIAL